# Patient Record
Sex: MALE | Race: WHITE | NOT HISPANIC OR LATINO | Employment: FULL TIME | URBAN - METROPOLITAN AREA
[De-identification: names, ages, dates, MRNs, and addresses within clinical notes are randomized per-mention and may not be internally consistent; named-entity substitution may affect disease eponyms.]

---

## 2021-02-22 ENCOUNTER — APPOINTMENT (EMERGENCY)
Dept: CT IMAGING | Facility: HOSPITAL | Age: 51
DRG: 872 | End: 2021-02-22
Payer: COMMERCIAL

## 2021-02-22 ENCOUNTER — HOSPITAL ENCOUNTER (INPATIENT)
Facility: HOSPITAL | Age: 51
LOS: 2 days | Discharge: HOME | DRG: 872 | End: 2021-02-24
Attending: EMERGENCY MEDICINE | Admitting: HOSPITALIST
Payer: COMMERCIAL

## 2021-02-22 DIAGNOSIS — A41.9 SEPSIS, DUE TO UNSPECIFIED ORGANISM, UNSPECIFIED WHETHER ACUTE ORGAN DYSFUNCTION PRESENT (HCC): ICD-10-CM

## 2021-02-22 DIAGNOSIS — K57.32 SIGMOID DIVERTICULITIS: Primary | ICD-10-CM

## 2021-02-22 PROBLEM — K57.92 DIVERTICULITIS: Status: ACTIVE | Noted: 2021-02-22

## 2021-02-22 PROBLEM — R65.20 SEVERE SEPSIS (HCC): Status: ACTIVE | Noted: 2021-02-22

## 2021-02-22 PROBLEM — I95.9 HYPOTENSION: Status: ACTIVE | Noted: 2021-02-22

## 2021-02-22 LAB
ALBUMIN SERPL BCP-MCNC: 4.2 G/DL (ref 3.5–5)
ALP SERPL-CCNC: 88 U/L (ref 46–116)
ALT SERPL W P-5'-P-CCNC: 36 U/L (ref 12–78)
ANION GAP SERPL CALCULATED.3IONS-SCNC: 6 MMOL/L (ref 4–13)
AST SERPL W P-5'-P-CCNC: 18 U/L (ref 5–45)
BASOPHILS # BLD AUTO: 0.06 THOUSANDS/ΜL (ref 0–0.1)
BASOPHILS NFR BLD AUTO: 1 % (ref 0–1)
BILIRUB SERPL-MCNC: 0.82 MG/DL (ref 0.2–1)
BILIRUB UR QL STRIP: NEGATIVE
BILIRUB UR QL STRIP: NEGATIVE
BUN SERPL-MCNC: 8 MG/DL (ref 5–25)
CALCIUM SERPL-MCNC: 8.8 MG/DL (ref 8.3–10.1)
CHLORIDE SERPL-SCNC: 102 MMOL/L (ref 100–108)
CLARITY UR: CLEAR
CLARITY UR: CLEAR
CO2 SERPL-SCNC: 28 MMOL/L (ref 21–32)
COLOR UR: YELLOW
COLOR UR: YELLOW
CREAT SERPL-MCNC: 0.88 MG/DL (ref 0.6–1.3)
EOSINOPHIL # BLD AUTO: 0.05 THOUSAND/ΜL (ref 0–0.61)
EOSINOPHIL NFR BLD AUTO: 0 % (ref 0–6)
ERYTHROCYTE [DISTWIDTH] IN BLOOD BY AUTOMATED COUNT: 13.1 % (ref 11.6–15.1)
GFR SERPL CREATININE-BSD FRML MDRD: 102 ML/MIN/1.73SQ M
GLUCOSE SERPL-MCNC: 112 MG/DL (ref 65–140)
GLUCOSE UR STRIP-MCNC: NEGATIVE MG/DL
GLUCOSE UR STRIP-MCNC: NEGATIVE MG/DL
HCT VFR BLD AUTO: 46.8 % (ref 36.5–49.3)
HGB BLD-MCNC: 15.3 G/DL (ref 12–17)
HGB UR QL STRIP.AUTO: NEGATIVE
HGB UR QL STRIP.AUTO: NEGATIVE
HOLD SPECIMEN: YES
IMM GRANULOCYTES # BLD AUTO: 0.05 THOUSAND/UL (ref 0–0.2)
IMM GRANULOCYTES NFR BLD AUTO: 0 % (ref 0–2)
INR PPP: 1.04 (ref 0.84–1.19)
KETONES UR STRIP-MCNC: NEGATIVE MG/DL
KETONES UR STRIP-MCNC: NEGATIVE MG/DL
LACTATE SERPL-SCNC: 0.9 MMOL/L (ref 0.5–2)
LEUKOCYTE ESTERASE UR QL STRIP: NEGATIVE
LEUKOCYTE ESTERASE UR QL STRIP: NEGATIVE
LIPASE SERPL-CCNC: 82 U/L (ref 73–393)
LYMPHOCYTES # BLD AUTO: 1.61 THOUSANDS/ΜL (ref 0.6–4.47)
LYMPHOCYTES NFR BLD AUTO: 12 % (ref 14–44)
MCH RBC QN AUTO: 30.1 PG (ref 26.8–34.3)
MCHC RBC AUTO-ENTMCNC: 32.7 G/DL (ref 31.4–37.4)
MCV RBC AUTO: 92 FL (ref 82–98)
MONOCYTES # BLD AUTO: 1.43 THOUSAND/ΜL (ref 0.17–1.22)
MONOCYTES NFR BLD AUTO: 11 % (ref 4–12)
NEUTROPHILS # BLD AUTO: 9.93 THOUSANDS/ΜL (ref 1.85–7.62)
NEUTS SEG NFR BLD AUTO: 76 % (ref 43–75)
NITRITE UR QL STRIP: NEGATIVE
NITRITE UR QL STRIP: NEGATIVE
NRBC BLD AUTO-RTO: 0 /100 WBCS
PH UR STRIP.AUTO: 6 [PH]
PH UR STRIP.AUTO: 6 [PH] (ref 4.5–8)
PLATELET # BLD AUTO: 250 THOUSANDS/UL (ref 149–390)
PMV BLD AUTO: 11 FL (ref 8.9–12.7)
POTASSIUM SERPL-SCNC: 4.2 MMOL/L (ref 3.5–5.3)
PROT SERPL-MCNC: 7.8 G/DL (ref 6.4–8.2)
PROT UR STRIP-MCNC: NEGATIVE MG/DL
PROT UR STRIP-MCNC: NEGATIVE MG/DL
PROTHROMBIN TIME: 13.7 SECONDS (ref 11.6–14.5)
RBC # BLD AUTO: 5.09 MILLION/UL (ref 3.88–5.62)
SODIUM SERPL-SCNC: 136 MMOL/L (ref 136–145)
SP GR UR STRIP.AUTO: 1.01 (ref 1–1.03)
SP GR UR STRIP.AUTO: <=1.005 (ref 1–1.03)
UROBILINOGEN UR QL STRIP.AUTO: 0.2 E.U./DL
UROBILINOGEN UR QL STRIP.AUTO: 0.2 E.U./DL
WBC # BLD AUTO: 13.13 THOUSAND/UL (ref 4.31–10.16)

## 2021-02-22 PROCEDURE — 96360 HYDRATION IV INFUSION INIT: CPT

## 2021-02-22 PROCEDURE — 81003 URINALYSIS AUTO W/O SCOPE: CPT | Performed by: EMERGENCY MEDICINE

## 2021-02-22 PROCEDURE — 36415 COLL VENOUS BLD VENIPUNCTURE: CPT | Performed by: EMERGENCY MEDICINE

## 2021-02-22 PROCEDURE — 85610 PROTHROMBIN TIME: CPT | Performed by: NURSE PRACTITIONER

## 2021-02-22 PROCEDURE — 93005 ELECTROCARDIOGRAM TRACING: CPT

## 2021-02-22 PROCEDURE — 81003 URINALYSIS AUTO W/O SCOPE: CPT

## 2021-02-22 PROCEDURE — 84145 PROCALCITONIN (PCT): CPT | Performed by: NURSE PRACTITIONER

## 2021-02-22 PROCEDURE — 99223 1ST HOSP IP/OBS HIGH 75: CPT | Performed by: NURSE PRACTITIONER

## 2021-02-22 PROCEDURE — G1004 CDSM NDSC: HCPCS

## 2021-02-22 PROCEDURE — 74177 CT ABD & PELVIS W/CONTRAST: CPT

## 2021-02-22 PROCEDURE — 99285 EMERGENCY DEPT VISIT HI MDM: CPT | Performed by: EMERGENCY MEDICINE

## 2021-02-22 PROCEDURE — 85025 COMPLETE CBC W/AUTO DIFF WBC: CPT | Performed by: EMERGENCY MEDICINE

## 2021-02-22 PROCEDURE — 83690 ASSAY OF LIPASE: CPT | Performed by: EMERGENCY MEDICINE

## 2021-02-22 PROCEDURE — 87040 BLOOD CULTURE FOR BACTERIA: CPT | Performed by: NURSE PRACTITIONER

## 2021-02-22 PROCEDURE — 83605 ASSAY OF LACTIC ACID: CPT | Performed by: NURSE PRACTITIONER

## 2021-02-22 PROCEDURE — 80053 COMPREHEN METABOLIC PANEL: CPT | Performed by: EMERGENCY MEDICINE

## 2021-02-22 PROCEDURE — 99285 EMERGENCY DEPT VISIT HI MDM: CPT

## 2021-02-22 RX ORDER — ACETAMINOPHEN 325 MG/1
650 TABLET ORAL EVERY 6 HOURS PRN
Status: DISCONTINUED | OUTPATIENT
Start: 2021-02-22 | End: 2021-02-24 | Stop reason: HOSPADM

## 2021-02-22 RX ORDER — ONDANSETRON 2 MG/ML
4 INJECTION INTRAMUSCULAR; INTRAVENOUS ONCE
Status: DISCONTINUED | OUTPATIENT
Start: 2021-02-22 | End: 2021-02-24 | Stop reason: HOSPADM

## 2021-02-22 RX ORDER — HYDROMORPHONE HCL/PF 1 MG/ML
0.5 SYRINGE (ML) INJECTION ONCE
Status: COMPLETED | OUTPATIENT
Start: 2021-02-22 | End: 2021-02-22

## 2021-02-22 RX ORDER — SODIUM CHLORIDE, SODIUM LACTATE, POTASSIUM CHLORIDE, CALCIUM CHLORIDE 600; 310; 30; 20 MG/100ML; MG/100ML; MG/100ML; MG/100ML
125 INJECTION, SOLUTION INTRAVENOUS CONTINUOUS
Status: DISCONTINUED | OUTPATIENT
Start: 2021-02-22 | End: 2021-02-24 | Stop reason: HOSPADM

## 2021-02-22 RX ORDER — OXYCODONE HYDROCHLORIDE 5 MG/1
2.5 TABLET ORAL EVERY 4 HOURS PRN
Status: DISCONTINUED | OUTPATIENT
Start: 2021-02-22 | End: 2021-02-24 | Stop reason: HOSPADM

## 2021-02-22 RX ORDER — HYDROMORPHONE HCL/PF 1 MG/ML
0.2 SYRINGE (ML) INJECTION EVERY 4 HOURS PRN
Status: DISCONTINUED | OUTPATIENT
Start: 2021-02-22 | End: 2021-02-24 | Stop reason: HOSPADM

## 2021-02-22 RX ORDER — ONDANSETRON 2 MG/ML
4 INJECTION INTRAMUSCULAR; INTRAVENOUS EVERY 6 HOURS PRN
Status: DISCONTINUED | OUTPATIENT
Start: 2021-02-22 | End: 2021-02-24 | Stop reason: HOSPADM

## 2021-02-22 RX ORDER — OXYCODONE HYDROCHLORIDE 5 MG/1
5 TABLET ORAL EVERY 4 HOURS PRN
Status: DISCONTINUED | OUTPATIENT
Start: 2021-02-22 | End: 2021-02-24 | Stop reason: HOSPADM

## 2021-02-22 RX ADMIN — SODIUM CHLORIDE 1000 ML: 0.9 INJECTION, SOLUTION INTRAVENOUS at 19:57

## 2021-02-22 RX ADMIN — SODIUM CHLORIDE 1000 ML: 0.9 INJECTION, SOLUTION INTRAVENOUS at 22:05

## 2021-02-22 RX ADMIN — SODIUM CHLORIDE 1000 ML: 0.9 INJECTION, SOLUTION INTRAVENOUS at 23:45

## 2021-02-22 RX ADMIN — PIPERACILLIN AND TAZOBACTAM 3.38 G: 3; .375 INJECTION, POWDER, LYOPHILIZED, FOR SOLUTION INTRAVENOUS at 22:06

## 2021-02-22 RX ADMIN — IOHEXOL 100 ML: 350 INJECTION, SOLUTION INTRAVENOUS at 20:28

## 2021-02-22 RX ADMIN — HYDROMORPHONE HYDROCHLORIDE 0.5 MG: 1 INJECTION, SOLUTION INTRAMUSCULAR; INTRAVENOUS; SUBCUTANEOUS at 22:06

## 2021-02-23 LAB
ALBUMIN SERPL BCP-MCNC: 3.1 G/DL (ref 3.5–5)
ALP SERPL-CCNC: 64 U/L (ref 46–116)
ALT SERPL W P-5'-P-CCNC: 23 U/L (ref 12–78)
ANION GAP SERPL CALCULATED.3IONS-SCNC: 10 MMOL/L (ref 4–13)
AST SERPL W P-5'-P-CCNC: 21 U/L (ref 5–45)
BASOPHILS # BLD AUTO: 0.05 THOUSANDS/ΜL (ref 0–0.1)
BASOPHILS NFR BLD AUTO: 0 % (ref 0–1)
BILIRUB SERPL-MCNC: 0.85 MG/DL (ref 0.2–1)
BUN SERPL-MCNC: 7 MG/DL (ref 5–25)
CALCIUM ALBUM COR SERPL-MCNC: 8.5 MG/DL (ref 8.3–10.1)
CALCIUM SERPL-MCNC: 7.8 MG/DL (ref 8.3–10.1)
CHLORIDE SERPL-SCNC: 107 MMOL/L (ref 100–108)
CO2 SERPL-SCNC: 22 MMOL/L (ref 21–32)
CREAT SERPL-MCNC: 0.72 MG/DL (ref 0.6–1.3)
EOSINOPHIL # BLD AUTO: 0.03 THOUSAND/ΜL (ref 0–0.61)
EOSINOPHIL NFR BLD AUTO: 0 % (ref 0–6)
ERYTHROCYTE [DISTWIDTH] IN BLOOD BY AUTOMATED COUNT: 13.3 % (ref 11.6–15.1)
GFR SERPL CREATININE-BSD FRML MDRD: 110 ML/MIN/1.73SQ M
GLUCOSE SERPL-MCNC: 116 MG/DL (ref 65–140)
HCT VFR BLD AUTO: 40.8 % (ref 36.5–49.3)
HGB BLD-MCNC: 13.5 G/DL (ref 12–17)
IMM GRANULOCYTES # BLD AUTO: 0.06 THOUSAND/UL (ref 0–0.2)
IMM GRANULOCYTES NFR BLD AUTO: 1 % (ref 0–2)
LYMPHOCYTES # BLD AUTO: 1.5 THOUSANDS/ΜL (ref 0.6–4.47)
LYMPHOCYTES NFR BLD AUTO: 13 % (ref 14–44)
MCH RBC QN AUTO: 31.1 PG (ref 26.8–34.3)
MCHC RBC AUTO-ENTMCNC: 33.1 G/DL (ref 31.4–37.4)
MCV RBC AUTO: 94 FL (ref 82–98)
MONOCYTES # BLD AUTO: 1.37 THOUSAND/ΜL (ref 0.17–1.22)
MONOCYTES NFR BLD AUTO: 12 % (ref 4–12)
NEUTROPHILS # BLD AUTO: 8.24 THOUSANDS/ΜL (ref 1.85–7.62)
NEUTS SEG NFR BLD AUTO: 74 % (ref 43–75)
NRBC BLD AUTO-RTO: 0 /100 WBCS
PLATELET # BLD AUTO: 212 THOUSANDS/UL (ref 149–390)
PMV BLD AUTO: 11.3 FL (ref 8.9–12.7)
POTASSIUM SERPL-SCNC: 3.9 MMOL/L (ref 3.5–5.3)
PROCALCITONIN SERPL-MCNC: 0.09 NG/ML
PROT SERPL-MCNC: 6.1 G/DL (ref 6.4–8.2)
RBC # BLD AUTO: 4.34 MILLION/UL (ref 3.88–5.62)
SODIUM SERPL-SCNC: 139 MMOL/L (ref 136–145)
WBC # BLD AUTO: 11.25 THOUSAND/UL (ref 4.31–10.16)

## 2021-02-23 PROCEDURE — 99232 SBSQ HOSP IP/OBS MODERATE 35: CPT | Performed by: HOSPITALIST

## 2021-02-23 PROCEDURE — 99223 1ST HOSP IP/OBS HIGH 75: CPT | Performed by: INTERNAL MEDICINE

## 2021-02-23 PROCEDURE — 85025 COMPLETE CBC W/AUTO DIFF WBC: CPT | Performed by: NURSE PRACTITIONER

## 2021-02-23 PROCEDURE — 80053 COMPREHEN METABOLIC PANEL: CPT | Performed by: NURSE PRACTITIONER

## 2021-02-23 RX ADMIN — SODIUM CHLORIDE 250 ML: 0.9 INJECTION, SOLUTION INTRAVENOUS at 00:35

## 2021-02-23 RX ADMIN — SODIUM CHLORIDE, SODIUM LACTATE, POTASSIUM CHLORIDE, AND CALCIUM CHLORIDE 125 ML/HR: .6; .31; .03; .02 INJECTION, SOLUTION INTRAVENOUS at 01:22

## 2021-02-23 RX ADMIN — PIPERACILLIN AND TAZOBACTAM 3.38 G: 36; 4.5 INJECTION, POWDER, FOR SOLUTION INTRAVENOUS at 21:28

## 2021-02-23 RX ADMIN — PIPERACILLIN AND TAZOBACTAM 3.38 G: 36; 4.5 INJECTION, POWDER, FOR SOLUTION INTRAVENOUS at 05:13

## 2021-02-23 RX ADMIN — PIPERACILLIN AND TAZOBACTAM 3.38 G: 36; 4.5 INJECTION, POWDER, FOR SOLUTION INTRAVENOUS at 09:21

## 2021-02-23 RX ADMIN — SODIUM CHLORIDE, SODIUM LACTATE, POTASSIUM CHLORIDE, AND CALCIUM CHLORIDE 125 ML/HR: .6; .31; .03; .02 INJECTION, SOLUTION INTRAVENOUS at 11:34

## 2021-02-23 RX ADMIN — ENOXAPARIN SODIUM 40 MG: 40 INJECTION SUBCUTANEOUS at 09:21

## 2021-02-23 RX ADMIN — SODIUM CHLORIDE, SODIUM LACTATE, POTASSIUM CHLORIDE, AND CALCIUM CHLORIDE 125 ML/HR: .6; .31; .03; .02 INJECTION, SOLUTION INTRAVENOUS at 21:20

## 2021-02-23 RX ADMIN — PIPERACILLIN AND TAZOBACTAM 3.38 G: 36; 4.5 INJECTION, POWDER, FOR SOLUTION INTRAVENOUS at 15:39

## 2021-02-23 NOTE — ASSESSMENT & PLAN NOTE
· Likely secondary to severe sepsis    S/p 1 L fluid bolus with improvement in blood pressure  · Check orthostatic BP  · Fall precaution  · IV fluids as outlined above

## 2021-02-23 NOTE — ED NOTES
Provided patient with urine cup and informed of need for sample   States "I can not provide sample right now "     Lavonne Shah RN  02/22/21 1958

## 2021-02-23 NOTE — UTILIZATION REVIEW
Initial Clinical Review    Admission: Date/Time/Statement:   Admission Orders (From admission, onward)     Ordered        02/22/21 2108  Inpatient Admission  Once                   Orders Placed This Encounter   Procedures    Inpatient Admission     Standing Status:   Standing     Number of Occurrences:   1     Order Specific Question:   Level of Care     Answer:   Med Surg [16]     Order Specific Question:   Estimated length of stay     Answer:   More than 2 Midnights     Order Specific Question:   Certification     Answer:   I certify that inpatient services are medically necessary for this patient for a duration of greater than two midnights  See H&P and MD Progress Notes for additional information about the patient's course of treatment  ED Arrival Information     Expected Arrival Acuity Means of Arrival Escorted By Service Admission Type    2/22/2021 2/22/2021 18:46 Urgent Walk-In Self Hospitalist Urgent    Arrival Complaint    fever / abdominal pain        Chief Complaint   Patient presents with    Abdominal Pain     C/o generalized lower ABD pain starting last night  Additionally c/o fever starting this afternoon  Assessment/Plan: 53 yo male to ED from Urgent Care admitted as Inpatient due to Severe sepsis, hypotension w Diverticulitis  Reports 1 day worsening of left lower quadrant pain w fevers ([id number])  Sought eval in Urgent care & advised ED eval  Endorses "bladder spasms", reports he increased fluid intake bc he thought he had UTI  IN ED: labile BPs- CT w sigmoid diverticulitis  Received 1 L fluid ED & ongoing IV fluids  Continue ongoing IV antibx  Obtain blood cultures STAT  COVID negative 2/20  Serial abd exam & consult GI    2/23/2021 PROVIDER  Severe sepsis w source suspected sigmoid diverticulitis  IVF s/p 30 ML/KG w frequent vitals; cont IVF  Denies prior hx of diverticulitis or prio colonoscopy  Cont serial eval , GI consult  Check orthostatic BP  Fall precaution    2/23/2021 GI  Diverticulitis without complications, first episode: Patient with three day history of abdominal pain and one day history of fevers presented to ED  CT with contrast noting severe stranding around sigmoid colon consistent with diverticulitis  WBC on arrival was 13 13 now 11 25   UA normal  Patient started on Zosyn and fluids  Patient on clear liquid diet   -Continue abx/fluids   -Continue pain medications and anti-emetics as needed  -Diet   -Recommend outpatient colonoscopy in 6-8 weeks   -Abdominal exams to assess for increased pain, distention, nausea, vomiting    ED Triage Vitals [02/22/21 1901]   Temperature Pulse Respirations Blood Pressure SpO2   98 °F (36 7 °C) 98 18 (!) 179/86 98 %      Temp Source Heart Rate Source Patient Position - Orthostatic VS BP Location FiO2 (%)   Temporal Monitor Sitting Left arm --      Pain Score       5          Wt Readings from Last 1 Encounters:   02/23/21 101 kg (222 lb 10 6 oz)     Additional Vital Signs:   Date/Time  Temp  Pulse  Resp  BP  SpO2  O2 Device  Patient Position - Orthostatic VS   02/23/21 1100  --  --  --  160/89  --  --  Standing for 3 minutes - Orthostatic VS   02/23/21 1057  --  --  --  155/89  --  --  Standing - Orthostatic VS   02/23/21 1056  --  --  --  146/87  --  --  Sitting - Orthostatic VS   02/23/21 1055  --  --  --  142/85  --  --  Lying - Orthostatic VS   02/23/21 0711  99 2 °F (37 3 °C)  81  16  137/79  97 %  None (Room air)  Lying   02/23/21 0200  --  77  --  141/71  --  --  Lying   02/23/21 0145  --  --  18  126/71  --  --  Lying   02/23/21 0130  --  79  18  131/70  --  --  Lying   02/23/21 0117  --  80  16  132/68  97 %  None (Room air)  Lying   02/22/21 2349  --  84  16  123/65  94 %  None (Room air)  Lying   02/22/21 2127  --  102  18  167/87  96 %  None (Room air)  Standing for 3 minutes - Orthostatic VS   02/22/21 2124  --  102  18  163/77  94 %  None (Room air)  Standing - Orthostatic VS   02/22/21 2123  --  99  18  176/87Abnormal   96 % None (Room air)  Sitting - Orthostatic VS   02/22/21 2119  99 8 °F (37 7 °C)  91  18  166/78  96 %  None (Room air)  Lying - Orthostatic VS   02/22/21 1940  --  81  16  140/69  94 %  None (Room air)  Lying   02/22/21 1919  --  68  12  79/46Abnormal    99 %  None (Room air)  Sitting   BP: feeling "like Im gonna pass out" at 02/22/21 1919 02/22/21 1901  98 °F (36 7 °C)  98  18  179/86Abnormal   98 %  None (Room air)  Sitting      Weights (last 14 days)    Date/Time  Weight  Weight Method  Height   02/23/21 0556  101 kg (222 lb 10 6 oz)  Bed scale  --   02/23/21 0117  101 kg (222 lb 3 6 oz)  Bed scale  --   02/22/21 2119  104 kg (228 lb 13 4 oz)  Bed scale  5' 10" (1 778 m)       Pertinent Labs/Diagnostic Test Results:   Results from last 7 days   Lab Units 02/20/21  1104   SARS-COV-2  Negative     Results from last 7 days   Lab Units 02/23/21  0501 02/22/21  1905   WBC Thousand/uL 11 25* 13 13*   HEMOGLOBIN g/dL 13 5 15 3   HEMATOCRIT % 40 8 46 8   PLATELETS Thousands/uL 212 250   NEUTROS ABS Thousands/µL 8 24* 9 93*         Results from last 7 days   Lab Units 02/23/21  0501 02/22/21  1905   SODIUM mmol/L 139 136   POTASSIUM mmol/L 3 9 4 2   CHLORIDE mmol/L 107 102   CO2 mmol/L 22 28   ANION GAP mmol/L 10 6   BUN mg/dL 7 8   CREATININE mg/dL 0 72 0 88   EGFR ml/min/1 73sq m 110 102   CALCIUM mg/dL 7 8* 8 8     Results from last 7 days   Lab Units 02/23/21  0501 02/22/21  1905   AST U/L 21 18   ALT U/L 23 36   ALK PHOS U/L 64 88   TOTAL PROTEIN g/dL 6 1* 7 8   ALBUMIN g/dL 3 1* 4 2   TOTAL BILIRUBIN mg/dL 0 85 0 82         Results from last 7 days   Lab Units 02/23/21  0501 02/22/21  1905   GLUCOSE RANDOM mg/dL 116 112       Results from last 7 days   Lab Units 02/22/21  1905   PROTIME seconds 13 7   INR  1 04         Results from last 7 days   Lab Units 02/22/21  2204   PROCALCITONIN ng/ml 0 09     Results from last 7 days   Lab Units 02/22/21  2204   LACTIC ACID mmol/L 0 9                         Results from last 7 days   Lab Units 02/22/21  1905   LIPASE u/L 82             Results from last 7 days   Lab Units 02/22/21  2046 02/22/21  2043   CLARITY UA  Clear Clear   COLOR UA  Yellow Yellow   SPEC GRAV UA  1 010 <=1 005   PH UA  6 0 6 0   GLUCOSE UA mg/dl Negative Negative   KETONES UA mg/dl Negative Negative   BLOOD UA  Negative Negative   PROTEIN UA mg/dl Negative Negative   NITRITE UA  Negative Negative   BILIRUBIN UA  Negative Negative   UROBILINOGEN UA E U /dl 0 2 0 2   LEUKOCYTES UA  Negative Negative     Results from last 7 days   Lab Units 02/22/21  2204 02/22/21  1903   BLOOD CULTURE  Received in Microbiology Lab  Culture in Progress  Received in Microbiology Lab  Culture in Progress  2/22/2021    CT abdomen pelvis with contrast   Final Result  (02/22 2049)      Sigmoid diverticulitis  ED Treatment:   Medication Administration from 02/22/2021 1829 to 02/23/2021 0101       Date/Time Order Dose Route Action     02/22/2021 1957 sodium chloride 0 9 % bolus 1,000 mL 1,000 mL Intravenous New Bag     02/22/2021 2206 piperacillin-tazobactam (ZOSYN) 3 375 g in sodium chloride 0 9 % 100 mL IVPB 3 375 g Intravenous New Bag     02/22/2021 2206 HYDROmorphone (DILAUDID) injection 0 5 mg 0 5 mg Intravenous Given     02/22/2021 2205 sodium chloride 0 9 % bolus 1,000 mL 1,000 mL Intravenous New Bag     02/22/2021 2345 sodium chloride 0 9 % bolus 1,000 mL 1,000 mL Intravenous New Bag     02/23/2021 0035 sodium chloride 0 9 % bolus 250 mL 250 mL Intravenous New Bag        History reviewed  No pertinent past medical history    Present on Admission:  **None**      Admitting Diagnosis: Abdominal pain [R10 9]  Sigmoid diverticulitis [K57 32]  Fever [R50 9]  Sepsis, due to unspecified organism, unspecified whether acute organ dysfunction present ([facility] ) [A41 9]  Age/Sex: 52 y o  male  Admission Orders:  Orthostatic BP  Up w assist  Clear liquid diet  Scheduled Medications:  enoxaparin, 40 mg, Subcutaneous, Daily  ondansetron, 4 mg, Intravenous, Once  piperacillin-tazobactam, 3 375 g, Intravenous, Q6H      Continuous IV Infusions:  lactated ringers, 125 mL/hr, Intravenous, Continuous    PRN Meds:  acetaminophen, 650 mg, Oral, Q6H PRN  HYDROmorphone, 0 2 mg, Intravenous, Q4H PRN  ondansetron, 4 mg, Intravenous, Q6H PRN  oxyCODONE, 2 5 mg, Oral, Q4H PRN  oxyCODONE, 5 mg, Oral, Q4H PRN    IP CONSULT TO GASTROENTEROLOGY  Network Utilization Review Department  ATTENTION: Please call with any questions or concerns to [phone] and carefully listen to the prompts so that you are directed to the right person  All voicemails are confidential   Paramjit Bueno all requests for admission clinical reviews, approved or denied determinations and any other requests to dedicated fax number below belonging to the campus where the patient is receiving treatment   List of dedicated fax numbers for the Facilities:  [address] DENIALS (Administrative/Medical Necessity) [phone]   [address] (Maternity/NICU/Pediatrics) [phone]   [address] [place] [address] [address] [place] [address] [address] ([address]) [address] [address] [address] [address] [place] [address] [phone]

## 2021-02-23 NOTE — H&P
H&P- Kavita Decent 1970, 52 y o  male MRN: [id number]    Unit/Bed#: Amado Alondra Encounter: [id number]    Primary Care Provider: Delia Dejesus MD   Date and time admitted to hospital: 2/22/2021  7:30 PM        * Severe sepsis [facility])  Assessment & Plan   SIRS criteria:  Pre-hospital temperature 102°, leukocytosis, tachycardia   Suspected source:  Sigmoid diverticulitis   UA:  Negative   Lactic acid:  Obtain stat   End organ damage:  Blood pressure 79/46  Responsive to IV fluids   COVID negative on 02/20   IV Fluids:  30 mL per kg fluid bolus with frequent vital signs  And continue with LR at 125 cc/hour   IV antibiotics:  Zosyn 3 375 g every 6 hours   Blood cultures:  Obtain stat      Diverticulitis  Assessment & Plan  · Presentation:  1 day history of bilateral lower quadrant (worsen in left lower quadrant), fever  Seen at urgent care in advised evaluation in the emergency department  · Denies prior history of diverticulitis  Denies prior colonoscopy  · CT abdomen pelvis:  Sigmoid diverticulitis  · Antibiotics:  As above  · Serial abdominal exams  · GI consultation    Hypotension  Assessment & Plan  · Likely secondary to severe sepsis  So far, received 1 L fluid bolus with improvement in blood pressure  · Check orthostatic BP  · Fall precaution  · IV fluids as outlined above      VTE Prophylaxis: Enoxaparin (Lovenox)  / reason for no mechanical VTE prophylaxis Low risk   Code Status:  Level 1  POLST: POLST is not applicable to this patient  Discussion with family:  Declined update to his girlfriend    Anticipated Length of Stay:  Patient will be admitted on an Inpatient basis with an anticipated length of stay of  greater than 2 midnights  Justification for Hospital Stay:  Severe sepsis requiring IV fluids, IV antibiotics, GI consultation    Total Time for Visit, including Counseling / Coordination of Care: 45 minutes    Greater than 50% of this total time spent on direct patient counseling and coordination of care  Chief Complaint:   Abdominal pain, fever    History of Present Illness:    Estefany Braun is a 52 y o  male with no significant past medical history who presents with 1 day history of bilateral lower quadrant pain with pain worse on the left lower quadrant and fever as high as 102  He was seen at urgent care in advised to be evaluated in the emergency department  He denies any chest pain, cough, shortness of breath, nausea, vomiting, diarrhea, bleeding, lower extremity edema  He does endorse "bladder spasms" in brief episode of lightheadedness while in emergency department  Patient reports he increased p o  Fluid intake as he thought he had a UTI  Patient meeting severe sepsis criteria on admission  Patient is admitted as inpatient for IV fluids, IV antibiotics  GI consultation pending    Review of Systems:    Review of Systems   Constitutional: Positive for chills and fever  Gastrointestinal: Positive for abdominal pain  Neurological: Positive for dizziness and light-headedness  All other systems reviewed and are negative  Past Medical and Surgical History:     History reviewed  No pertinent past medical history  History reviewed  No pertinent surgical history  Meds/Allergies:    Prior to Admission medications    Not on File     I have reviewed home medications with patient personally      Allergies: No Known Allergies    Social History:     Marital Status: Single     Substance Use History:   Social History     Substance and Sexual Activity   Alcohol Use Never    Frequency: Never     Social History     Tobacco Use   Smoking Status Never Smoker   Smokeless Tobacco Never Used     Social History     Substance and Sexual Activity   Drug Use Never       Family History:    non-contributory    Physical Exam:     Vitals:   Blood Pressure: 167/87 (02/22/21 2127)  Pulse: 102 (02/22/21 2127)  Temperature: 99 8 °F (37 7 °C) (02/22/21 2119)  Temp Source: Oral (02/22/21 2119)  Respirations: 18 (02/22/21 2127)  Height: 5' 10" (177 8 cm) (02/22/21 2119)  Weight - Scale: 104 kg (228 lb 13 4 oz) (02/22/21 2119)  SpO2: 96 % (02/22/21 2127)    Physical Exam  Constitutional:       General: He is not in acute distress  Appearance: Normal appearance  He is not ill-appearing  HENT:      Head: Normocephalic and atraumatic  Right Ear: External ear normal       Left Ear: External ear normal       Nose: Nose normal       Mouth/Throat:      Mouth: Mucous membranes are moist       Pharynx: Oropharynx is clear  Eyes:      General: No scleral icterus  Extraocular Movements: Extraocular movements intact  Conjunctiva/sclera: Conjunctivae normal    Neck:      Musculoskeletal: Normal range of motion and neck supple  Cardiovascular:      Rate and Rhythm: Normal rate and regular rhythm  Pulses: Normal pulses  Heart sounds: Normal heart sounds  Pulmonary:      Effort: Pulmonary effort is normal       Breath sounds: Normal breath sounds  Abdominal:      General: Bowel sounds are normal  There is no distension  Palpations: Abdomen is soft  Tenderness: There is abdominal tenderness (Bilateral lower quadrant, LLQ > RLQ)  There is no right CVA tenderness, left CVA tenderness, guarding or rebound  Musculoskeletal: Normal range of motion  Right lower leg: No edema  Left lower leg: No edema  Skin:     General: Skin is warm and dry  Capillary Refill: Capillary refill takes less than 2 seconds  Neurological:      General: No focal deficit present  Mental Status: He is alert and oriented to person, place, and time  Psychiatric:         Mood and Affect: Mood normal          Behavior: Behavior normal              Additional Data:     Lab Results: I have personally reviewed pertinent reports        Results from last 7 days   Lab Units 02/22/21  1905   WBC Thousand/uL 13 13*   HEMOGLOBIN g/dL 15 3   HEMATOCRIT % 46 8   PLATELETS Thousands/uL 250   NEUTROS PCT % 76*   LYMPHS PCT % 12*   MONOS PCT % 11   EOS PCT % 0     Results from last 7 days   Lab Units 02/22/21  1905   SODIUM mmol/L 136   POTASSIUM mmol/L 4 2   CHLORIDE mmol/L 102   CO2 mmol/L 28   BUN mg/dL 8   CREATININE mg/dL 0 88   ANION GAP mmol/L 6   CALCIUM mg/dL 8 8   ALBUMIN g/dL 4 2   TOTAL BILIRUBIN mg/dL 0 82   ALK PHOS U/L 88   ALT U/L 36   AST U/L 18   GLUCOSE RANDOM mg/dL 112     Results from last 7 days   Lab Units 02/22/21  1905   INR  1 04                   Imaging: I have personally reviewed pertinent reports  CT abdomen pelvis with contrast   Final Result by Cruz Velasquez MD (02/22 2049)      Sigmoid diverticulitis  Workstation performed: [id number]             EKG, Pathology, and Other Studies Reviewed on Admission:   · EKG:  Obtain baseline EKG now    Allscripts / Epic Records Reviewed: Yes     ** Please Note: This note has been constructed using a voice recognition system   **

## 2021-02-23 NOTE — ED PROVIDER NOTES
History  Chief Complaint   Patient presents with    Abdominal Pain     C/o generalized lower ABD pain starting last night  Additionally c/o fever starting this afternoon  This is a 70-year-old male presenting for evaluation of lower abdominal pain, fevers and chills for the past 3 days  He states he has had some intermittent discomfort with urination  Went to urgent care today and was told to come to the ED for evaluation  He was tested for COVID-19 and was negative today  History provided by:  Patient   used: No    Abdominal Pain  Pain location:  LLQ, RLQ and suprapubic  Pain quality: aching    Pain severity:  Moderate  Onset quality:  Gradual  Duration:  3 days  Timing:  Constant  Progression:  Worsening  Chronicity:  New  Associated symptoms: chills and fever        None       History reviewed  No pertinent past medical history  History reviewed  No pertinent surgical history  History reviewed  No pertinent family history  I have reviewed and agree with the history as documented  E-Cigarette/Vaping     E-Cigarette/Vaping Substances     Social History     Tobacco Use    Smoking status: Never Smoker    Smokeless tobacco: Never Used   Substance Use Topics    Alcohol use: Never     Frequency: Never    Drug use: Never       Review of Systems   Constitutional: Positive for chills and fever  Gastrointestinal: Positive for abdominal pain  All other systems reviewed and are negative  Physical Exam  Physical Exam  Vitals signs and nursing note reviewed  Constitutional:       General: He is not in acute distress  Appearance: Normal appearance  He is well-developed and normal weight  HENT:      Head: Normocephalic and atraumatic  Right Ear: External ear normal       Left Ear: External ear normal       Nose: Nose normal  No congestion or rhinorrhea  Mouth/Throat:      Mouth: Mucous membranes are moist       Pharynx: Oropharynx is clear     Eyes: General: No scleral icterus  Right eye: No discharge  Left eye: No discharge  Conjunctiva/sclera: Conjunctivae normal    Neck:      Musculoskeletal: Normal range of motion and neck supple  Cardiovascular:      Rate and Rhythm: Normal rate and regular rhythm  Pulses: Normal pulses  Heart sounds: Normal heart sounds  Pulmonary:      Effort: Pulmonary effort is normal  No respiratory distress  Breath sounds: Normal breath sounds  Abdominal:      General: Abdomen is flat  There is no distension  Palpations: Abdomen is soft  There is no mass or pulsatile mass  Tenderness: There is abdominal tenderness in the right lower quadrant, suprapubic area and left lower quadrant  There is rebound  There is no right CVA tenderness, left CVA tenderness or guarding  Hernia: No hernia is present  Musculoskeletal: Normal range of motion  General: No swelling  Skin:     General: Skin is warm and dry  Capillary Refill: Capillary refill takes less than 2 seconds  Neurological:      General: No focal deficit present  Mental Status: He is alert and oriented to person, place, and time  Mental status is at baseline     Psychiatric:         Mood and Affect: Mood normal          Behavior: Behavior normal          Vital Signs  ED Triage Vitals [02/22/21 1901]   Temperature Pulse Respirations Blood Pressure SpO2   98 °F (36 7 °C) 98 18 (!) 179/86 98 %      Temp Source Heart Rate Source Patient Position - Orthostatic VS BP Location FiO2 (%)   Temporal Monitor Sitting Left arm --      Pain Score       5           Vitals:    02/23/21 1500 02/23/21 1501 02/23/21 1502 02/23/21 1505   BP: 163/78 159/76 161/78 162/84   Pulse: 80 75 83 79   Patient Position - Orthostatic VS: Lying - Orthostatic VS Sitting - Orthostatic VS Standing - Orthostatic VS Standing for 3 minutes - Orthostatic VS         Visual Acuity      ED Medications  Medications   ondansetron (ZOFRAN) injection 4 mg (0 mg Intravenous Hold 2/22/21 1957)   ondansetron (ZOFRAN) injection 4 mg (has no administration in time range)   lactated ringers infusion (125 mL/hr Intravenous New Bag 2/23/21 1134)   acetaminophen (TYLENOL) tablet 650 mg (has no administration in time range)   enoxaparin (LOVENOX) subcutaneous injection 40 mg (40 mg Subcutaneous Given 2/23/21 0921)   piperacillin-tazobactam (ZOSYN) 3 375 g in sodium chloride 0 9 % 100 mL IVPB (3 375 g Intravenous New Bag 2/23/21 1539)   oxyCODONE (ROXICODONE) IR tablet 2 5 mg (has no administration in time range)   oxyCODONE (ROXICODONE) IR tablet 5 mg (has no administration in time range)   HYDROmorphone (DILAUDID) injection 0 2 mg (has no administration in time range)   sodium chloride 0 9 % bolus 1,000 mL (0 mL Intravenous Stopped 2/22/21 2130)   iohexol (OMNIPAQUE) 350 MG/ML injection (SINGLE-DOSE) 100 mL (100 mL Intravenous Given 2/22/21 2028)   piperacillin-tazobactam (ZOSYN) 3 375 g in sodium chloride 0 9 % 100 mL IVPB (0 g Intravenous Stopped 2/22/21 2236)   HYDROmorphone (DILAUDID) injection 0 5 mg (0 5 mg Intravenous Given 2/22/21 2206)   sodium chloride 0 9 % bolus 1,000 mL (0 mL Intravenous Stopped 2/22/21 2235)     Followed by   sodium chloride 0 9 % bolus 1,000 mL (0 mL Intravenous Stopped 2/23/21 0015)     Followed by   sodium chloride 0 9 % bolus 250 mL (0 mL Intravenous Stopped 2/23/21 1933)       Diagnostic Studies  Results Reviewed     Procedure Component Value Units Date/Time    Blood culture [[id number]] Collected: 02/22/21 1903    Lab Status: Preliminary result Specimen: Blood from Arm, Right Updated: 02/23/21 0801     Blood Culture Received in Microbiology Lab  Culture in Progress  Blood culture [[id number]] Collected: 02/22/21 2204    Lab Status: Preliminary result Specimen: Blood from Arm, Right Updated: 02/23/21 0801     Blood Culture Received in Microbiology Lab  Culture in Progress      Procalcitonin with AM Reflex [[id number]]  (Normal) Collected: 02/22/21 2204    Lab Status: Final result Specimen: Blood from Arm, Right Updated: 02/23/21 0626     Procalcitonin 0 09 ng/ml     Comprehensive metabolic panel [[id number]]  (Abnormal) Collected: 02/23/21 0501    Lab Status: Final result Specimen: Blood from Arm, Left Updated: 02/23/21 0183     Sodium 139 mmol/L      Potassium 3 9 mmol/L      Chloride 107 mmol/L      CO2 22 mmol/L      ANION GAP 10 mmol/L      BUN 7 mg/dL      Creatinine 0 72 mg/dL      Glucose 116 mg/dL      Calcium 7 8 mg/dL      Corrected Calcium 8 5 mg/dL      AST 21 U/L      ALT 23 U/L      Alkaline Phosphatase 64 U/L      Total Protein 6 1 g/dL      Albumin 3 1 g/dL      Total Bilirubin 0 85 mg/dL      eGFR 110 ml/min/1 73sq m     Narrative:      National Kid[employer] Foundation guidelines for Chronic Kidney Disease (CKD):     Stage 1 with normal or high GFR (GFR > 90 mL/min/1 73 square meters)    Stage 2 Mild CKD (GFR = 60-89 mL/min/1 73 square meters)    Stage 3A Moderate CKD (GFR = 45-59 mL/min/1 73 square meters)    Stage 3B Moderate CKD (GFR = 30-44 mL/min/1 73 square meters)    Stage 4 Severe CKD (GFR = 15-29 mL/min/1 73 square meters)    Stage 5 End Stage CKD (GFR <15 mL/min/1 73 square meters)  Note: GFR calculation is accurate only with a steady state creatinine    CBC and differential [[id number]]  (Abnormal) Collected: 02/23/21 0501    Lab Status: Final result Specimen: Blood from Arm, Left Updated: 02/23/21 0527     WBC 11 25 Thousand/uL      RBC 4 34 Million/uL      Hemoglobin 13 5 g/dL      Hematocrit 40 8 %      MCV 94 fL      MCH 31 1 pg      MCHC 33 1 g/dL      RDW 13 3 %      MPV 11 3 fL      Platelets 353 Thousands/uL      nRBC 0 /100 WBCs      Neutrophils Relative 74 %      Immat GRANS % 1 %      Lymphocytes Relative 13 %      Monocytes Relative 12 %      Eosinophils Relative 0 %      Basophils Relative 0 %      Neutrophils Absolute 8 24 Thousands/µL      Immature Grans Absolute 0 06 Thousand/uL      Lymphocytes Absolute 1 50 Thousands/µL      Monocytes Absolute 1 37 Thousand/µL      Eosinophils Absolute 0 03 Thousand/µL      Basophils Absolute 0 05 Thousands/µL     Lactic acid, plasma [[id number]]  (Normal) Collected: 02/22/21 2204    Lab Status: Final result Specimen: Blood from Arm, Right Updated: 02/22/21 2241     LACTIC ACID 0 9 mmol/L     Narrative:      Result may be elevated if tourniquet was used during collection      Protime-INR [[id number]]  (Normal) Collected: 02/22/21 1905    Lab Status: Final result Specimen: Blood from Arm, Left Updated: 02/22/21 2146     Protime 13 7 seconds      INR 1 04    UA w Reflex to Microscopic w Reflex to Culture [[id number]] Collected: 02/22/21 2043    Lab Status: Final result Specimen: Urine, Clean Catch Updated: 02/22/21 2058     Color, UA Yellow     Clarity, UA Clear     Specific Gravity, UA <=1 005     pH, UA 6 0     Leukocytes, UA Negative     Nitrite, UA Negative     Protein, UA Negative mg/dl      Glucose, UA Negative mg/dl      Ketones, UA Negative mg/dl      Urobilinogen, UA 0 2 E U /dl      Bilirubin, UA Negative     Blood, UA Negative    Urine Macroscopic, POC [[id number]] Collected: 02/22/21 2046    Lab Status: Final result Specimen: Urine Updated: 02/22/21 2048     Color, UA Yellow     Clarity, UA Clear     pH, UA 6 0     Leukocytes, UA Negative     Nitrite, UA Negative     Protein, UA Negative mg/dl      Glucose, UA Negative mg/dl      Ketones, UA Negative mg/dl      Urobilinogen, UA 0 2 E U /dl      Bilirubin, UA Negative     Blood, UA Negative     Specific Gravity, UA 1 010    Narrative:      CLINITEK RESULT    Comprehensive metabolic panel [[id number]] Collected: 02/22/21 1905    Lab Status: Final result Specimen: Blood from Arm, Left Updated: 02/22/21 1943     Sodium 136 mmol/L      Potassium 4 2 mmol/L      Chloride 102 mmol/L      CO2 28 mmol/L      ANION GAP 6 mmol/L      BUN 8 mg/dL      Creatinine 0 88 mg/dL      Glucose 112 mg/dL      Calcium 8 8 mg/dL      AST 18 U/L      ALT 36 U/L      Alkaline Phosphatase 88 U/L      Total Protein 7 8 g/dL      Albumin 4 2 g/dL      Total Bilirubin 0 82 mg/dL      eGFR 102 ml/min/1 73sq m     Narrative:      [place] guidelines for Chronic Kidney Disease (CKD):     Stage 1 with normal or high GFR (GFR > 90 mL/min/1 73 square meters)    Stage 2 Mild CKD (GFR = 60-89 mL/min/1 73 square meters)    Stage 3A Moderate CKD (GFR = 45-59 mL/min/1 73 square meters)    Stage 3B Moderate CKD (GFR = 30-44 mL/min/1 73 square meters)    Stage 4 Severe CKD (GFR = 15-29 mL/min/1 73 square meters)    Stage 5 End Stage CKD (GFR <15 mL/min/1 73 square meters)  Note: GFR calculation is accurate only with a steady state creatinine    Lipase [[id number]]  (Normal) Collected: 02/22/21 1905    Lab Status: Final result Specimen: Blood from Arm, Left Updated: 02/22/21 1943     Lipase 82 u/L     Trauma tubes on hold [[id number]] Collected: 02/22/21 1905    Lab Status: Final result Specimen: Blood from Arm, Left Updated: 02/22/21 1915    Narrative: The following orders were created for panel order Trauma tubes on hold  Procedure                               Abnormality         Status                     ---------                               -----------         ------                     [facility] [place] [facility] [place][id number][place]                            Final result                 Please view results for these tests on the individual orders      CBC and differential [[id number]]  (Abnormal) Collected: 02/22/21 1905    Lab Status: Final result Specimen: Blood from Arm, Left Updated: 02/22/21 1915     WBC 13 13 Thousand/uL      RBC 5 09 Million/uL      Hemoglobin 15 3 g/dL      Hematocrit 46 8 %      MCV 92 fL      MCH 30 1 pg      MCHC 32 7 g/dL      RDW 13 1 %      MPV 11 0 fL      Platelets 746 Thousands/uL      nRBC 0 /100 WBCs      Neutrophils Relative 76 %      Immat GRANS % 0 %      Lymphocytes Relative 12 %      Monocytes Relative 11 %      Eosinophils Relative 0 %      Basophils Relative 1 %      Neutrophils Absolute 9 93 Thousands/µL      Immature Grans Absolute 0 05 Thousand/uL      Lymphocytes Absolute 1 61 Thousands/µL      Monocytes Absolute 1 43 Thousand/µL      Eosinophils Absolute 0 05 Thousand/µL      Basophils Absolute 0 06 Thousands/µL                  CT abdomen pelvis with contrast   Final Result by Carlos Olmos MD (02/22 2049)      Sigmoid diverticulitis  Workstation performed: [id number]                    Procedures  Procedures         ED Course  ED Course as of Feb 23 2005 Mon Feb 22, 2021   2104 Pt w/ severe diverticulitis on CT, elev  Wbc at 13,000, and very tender lower abdomen  Will admit for IV abx  D/w Joselyn Ontiveros, admitting under Dr Alfreda Fabry service                                   Default Flowsheet Data (last 720 hours)      Sepsis Reassess     Row Name 02/22/21 2205                   Repeat Volume Status and Tissue Perfusion Assessment Performed    Repeat Volume Status and Tissue Perfusion Assessment Performed  Yes  -LH           Volume Status and Tissue Perfusion Post Fluid Resuscitation * Must Document All *    Vital Signs Reviewed (HR, RR, BP, T)  Yes  -        Shock Index Reviewed  --        Arterial Oxygen Saturation Reviewed (POx, SaO2 or SpO2)  Yes (comment %)  -        Cardio  Normal S1/S2  -        Pulmonary  Normal effort  -        Capillary Refill  Brisk  -        Peripheral Pulses  Dorsalis Pedis  -        Dorsalis Pedis  +2  -        Skin  Warm;Dry  -        Urine output assessed  Adequate  -           *OR*   Intensive Monitoring- Must Document One of the Following Four *:    Vital Signs Reviewed  --        * Central Venous Pressure (CVP or RAP)  --        * Central Venous Oxygen (SVO2, ScvO2 or Oxygen saturation via central catheter)  --        * Bedside Cardiovascular US in IVC diameter and % collapse  --        * Passive Leg Raise OR Crystalloid Challenge  -- User Key  (r) = Recorded By, (t) = Taken By, (c) = Cosigned By    [address]  Name Provider Type    [address], [facility]  Nurse Practitioner        SBIRT 20yo+      Most Recent Value   SBIRT (25 yo +)   In order to provide better care to our patients, we are screening all of our patients for alcohol and drug use  Would it be okay to ask you these screening questions? Yes Filed at: 02/22/2021 2023   Initial Alcohol Screen: US AUDIT-C    1  How often do you have a drink containing alcohol? 3 Filed at: 02/22/2021 2023   2  How many drinks containing alcohol do you have on a typical day you are drinking? 2 Filed at: 02/22/2021 2023   3a  Male UNDER 65: How often do you have five or more drinks on one occasion? 0 Filed at: 02/22/2021 2023   3b  FEMALE Any Age, or MALE 65+: How often do you have 4 or more drinks on one occassion? 0 Filed at: 02/22/2021 2023   Audit-C Score  5 Filed at: 02/22/2021 2023   BROCK: How many times in the past year have you    Used an illegal drug or used a prescription medication for non-medical reasons?   Never Filed at: 02/22/2021 2023                    MDM  Number of Diagnoses or Management Options  Sigmoid diverticulitis: new and requires workup     Amount and/or Complexity of Data Reviewed  Clinical lab tests: ordered and reviewed  Tests in the radiology section of CPT®: ordered and reviewed  Discuss the patient with other providers: yes  Independent visualization of images, tracings, or specimens: yes    Risk of Complications, Morbidity, and/or Mortality  Presenting problems: moderate  Diagnostic procedures: moderate  Management options: moderate    Patient Progress  Patient progress: stable      Disposition  Final diagnoses:   Sigmoid diverticulitis     Time reflects when diagnosis was documented in both MDM as applicable and the Disposition within this note     Time User Action Codes Description Comment    2/22/2021  9:06 PM Patrick Mathews Add [L63 67] Sigmoid diverticulitis 2/22/2021  9:48 PM Iman Samson Add [K57 [id number]] Diverticulitis     2/22/2021  9:48 PM Iman Samson Add [A41 9] Sepsis, due to unspecified organism, unspecified whether acute organ dysfunction present [facility])     2/22/2021  9:48 PM Katherine Loyd [B87 [id number]] Diverticulitis       ED Disposition     ED Disposition Condition Date/Time Comment    Admit Stable Mon Feb 22, 2021  9:06 PM Case was discussed with Glory Marquez and the patient's admission status was agreed to be Admission Status: inpatient status to the service of Dr Alexis Mar          Follow-up Information    None         There are no discharge medications for this patient  No discharge procedures on file      PDMP Review     None          ED Provider  Electronically Signed by           Fya Jones DO  02/23/21 2005

## 2021-02-23 NOTE — CONSULTS
Consultation - [facility]  Jt Zack 52 y o  male MRN: [id number]  Unit/Bed#: S -01 Encounter: [id number]        Inpatient consult to gastroenterology  Consult performed by: Milind Perez PA-C  Consult ordered by: Briseida Yu          Reason for Consult / Principal Problem: Diverticulitis    ASSESSMENT and PLAN:      1) Diverticulitis without complications, first episode: Patient with three day history of abdominal pain and one day history of fevers presented to ED  CT with contrast noting severe stranding around sigmoid colon consistent with diverticulitis  WBC on arrival was 13 13 now 11 25  Lipase normal  CMP unremarkable  UA normal  Patient started on Zosyn and fluids  Patient on clear liquid diet  No nausea/vomiting  Afebrile since arrival  Vital signs now stable  No prior colonoscopy  -Continue abx/fluids   -Continue pain medications and anti-emetics as needed   -Continue clear liquid diet for now  Consider advancing tomorrow if tolerating   -Recommend outpatient colonoscopy in 6-8 weeks   -Abdominal exams to assess for increased pain, distention, nausea, vomiting, fevers  -------------------------------------------------------------------------------------------------------------------    HPI:     This is a 52year old male with no significant PMH who presented to the ED yesterday (2/22) with abdominal pain, fevers and chills x 3 days  Upon arrival to the ED his vital signs were stable, afebrile  He had a WBC of 13 13  CMP normal  Lipase normal at 82  CT with contrast showed severe standing around sigmoid consistent with diverticulitis  Also noted hepatic lesions likely consistent with hepatic cysts which were too small to accurately characterize  He was hypertensive on arrival at 179,46 then had a symptomatic episodes of hypotension with BP 79/46  He was given 3 bolus of NaCl, Zofran and dilaudid  Zosyn was started and he was made NPO   GI was consulted for diverticulitis  Patient reports and onset of lower abdominal pain three days ago (2/20)  He notes the pain was achy, worse in the suprapubic area and constant, with movement making it worse  He took advil with some relief of pain  Yesterday he took his temperature before work and noted it was 100 3  He went to urgent care who reported a temperature of 102 and sent him to the ER  Patient reports that his abdominal pain is much improved from yesterday with a majority of his pain only with movement or palpation  He denies nausea, vomiting, recent weight loss, decreased appetite or previous episodes of diverticulitis  He denies change in bowel movements, blood in stool  He also denies dysuria, hematuria  He drinks one beer almost daily  He smokes 1/2 pack a day for the last 30 years  He has never had a colonoscopy  Reports that his father had prostate and bladder cancer and his maternal grandfather had prostate and GB carcinoma  REVIEW OF SYSTEMS:    CONSTITUTIONAL: Denies any fever, chills, or rigors  Good appetite, and no recent weight loss  HEENT: No earache or tinnitus  Denies hearing loss or visual disturbances  CARDIOVASCULAR: No chest pain or palpitations  RESPIRATORY: Denies any cough, hemoptysis, shortness of breath or dyspnea on exertion  GASTROINTESTINAL: As noted in the History of Present Illness  GENITOURINARY: No problems with urination  Denies any hematuria or dysuria  NEUROLOGIC: No dizziness or vertigo, denies headaches  MUSCULOSKELETAL: Denies any muscle or joint pain  SKIN: Denies skin rashes or itching  ENDOCRINE: Denies excessive thirst  Denies intolerance to heat or cold  PSYCHOSOCIAL: Denies depression or anxiety  Denies any recent memory loss  Historical Information   History reviewed  No pertinent past medical history  History reviewed  No pertinent surgical history    Social History   Social History     Substance and Sexual Activity   Alcohol Use Never    Frequency: Never     Social History     Substance and Sexual Activity   Drug Use Never     Social History     Tobacco Use   Smoking Status Never Smoker   Smokeless Tobacco Never Used     History reviewed  No pertinent family history  Meds/Allergies     No medications prior to admission  Current Facility-Administered Medications   Medication Dose Route Frequency    acetaminophen (TYLENOL) tablet 650 mg  650 mg Oral Q6H PRN    enoxaparin (LOVENOX) subcutaneous injection 40 mg  40 mg Subcutaneous Daily    HYDROmorphone (DILAUDID) injection 0 2 mg  0 2 mg Intravenous Q4H PRN    lactated ringers infusion  125 mL/hr Intravenous Continuous    ondansetron (ZOFRAN) injection 4 mg  4 mg Intravenous Once    ondansetron (ZOFRAN) injection 4 mg  4 mg Intravenous Q6H PRN    oxyCODONE (ROXICODONE) IR tablet 2 5 mg  2 5 mg Oral Q4H PRN    oxyCODONE (ROXICODONE) IR tablet 5 mg  5 mg Oral Q4H PRN    piperacillin-tazobactam (ZOSYN) 3 375 g in sodium chloride 0 9 % 100 mL IVPB  3 375 g Intravenous Q6H       No Known Allergies        Objective     Blood pressure 137/79, pulse 81, temperature 99 2 °F (37 3 °C), temperature source Oral, resp  rate 16, height 5' 10" (1 778 m), weight 101 kg (222 lb 10 6 oz), SpO2 97 %  Intake/Output Summary (Last 24 hours) at 2/23/2021 6959  Last data filed at 2/23/2021 0015  Gross per 24 hour   Intake 3100 ml   Output --   Net 3100 ml         PHYSICAL EXAM:      General Appearance:   Alert, cooperative, no distress, appears stated age  HEENT:   Normocephalic, atraumatic, anicteric, no oropharyngeal thrush present      Neck:  Supple, symmetrical, trachea midline, no adenopathy;    thyroid: no enlargement/tenderness/nodules; no carotid  bruit or JVD    Lungs:   Clear to auscultation bilaterally; no rales, rhonchi or wheezing; respirations unlabored    Heart[de-identified]   S1 and S2 normal; regular rate and rhythm; no murmur, rub, or gallop     Abdomen:   Tender to palpation in RLQ/suprapubic and LLQ  Soft, non-distended; normal bowel sounds; no masses, no organomegaly  Genitalia:   Deferred    Rectal:   Deferred    Extremities:  No cyanosis, clubbing or edema    Pulses:  2+ and symmetric all extremities    Skin:  Skin color, texture, turgor normal, no rashes or lesions    Lymph nodes:  No palpable cervical, axillary or inguinal lymphadenopathy        Lab Results:   Results from last 7 days   Lab Units 02/23/21  0501   WBC Thousand/uL 11 25*   HEMOGLOBIN g/dL 13 5   HEMATOCRIT % 40 8   PLATELETS Thousands/uL 212   NEUTROS PCT % 74   LYMPHS PCT % 13*   MONOS PCT % 12   EOS PCT % 0     Results from last 7 days   Lab Units 02/23/21  0501   POTASSIUM mmol/L 3 9   CHLORIDE mmol/L 107   CO2 mmol/L 22   BUN mg/dL 7   CREATININE mg/dL 0 72   CALCIUM mg/dL 7 8*   ALK PHOS U/L 64   ALT U/L 23   AST U/L 21     Results from last 7 days   Lab Units 02/22/21  1905   INR  1 04     Results from last 7 days   Lab Units 02/22/21  1905   LIPASE u/L 82       Imaging Studies: I have personally reviewed pertinent imaging studies  Ct Abdomen Pelvis With Contrast    Result Date: 2/22/2021  Impression: Sigmoid diverticulitis  Workstation performed: [id number]           Patient was seen and examined by Dr Lianne Monsalve  All espinoza medical decisions were made by Dr Lianne Monsalve  Thank you for allowing us to participate in the care of this present patient  We will follow-up with you closely

## 2021-02-23 NOTE — ASSESSMENT & PLAN NOTE
· Likely secondary to severe sepsis    So far, received 1 L fluid bolus with improvement in blood pressure  · Check orthostatic BP  · Fall precaution  · IV fluids as outlined above

## 2021-02-23 NOTE — PLAN OF CARE
Problem: GASTROINTESTINAL - ADULT  Goal: Minimal or absence of nausea and/or vomiting  Description: INTERVENTIONS:  - Administer IV fluids if ordered to ensure adequate hydration  - Maintain NPO status until nausea and vomiting are resolved  - Nasogastric tube if ordered  - Administer ordered antiemetic medications as needed  - Provide nonpharmacologic comfort measures as appropriate  - Advance diet as tolerated, if ordered  - Consider nutrition services referral to assist patient with adequate nutrition and appropriate food choices  Outcome: Progressing  Goal: Maintains or returns to baseline bowel function  Description: INTERVENTIONS:  - Assess bowel function  - Encourage oral fluids to ensure adequate hydration  - Administer IV fluids if ordered to ensure adequate hydration  - Administer ordered medications as needed  - Encourage mobilization and activity  - Consider nutritional services referral to assist patient with adequate nutrition and appropriate food choices  Outcome: Progressing  Goal: Maintains adequate nutritional intake  Description: INTERVENTIONS:  - Monitor percentage of each meal consumed  - Identify factors contributing to decreased intake, treat as appropriate  - Assist with meals as needed  - Monitor I&O, weight, and lab values if indicated  - Obtain nutrition services referral as needed  Outcome: Progressing     Problem: GENITOURINARY - ADULT  Goal: Maintains or returns to baseline urinary function  Description: INTERVENTIONS:  - Assess urinary function  - Encourage oral fluids to ensure adequate hydration if ordered  - Administer IV fluids as ordered to ensure adequate hydration  - Administer ordered medications as needed  - Offer frequent toileting  - Follow urinary retention protocol if ordered  Outcome: Progressing  Goal: Absence of urinary retention  Description: INTERVENTIONS:  - Assess patients ability to void and empty bladder  - Monitor I/O  - Bladder scan as needed  - Discuss with physician/AP medications to alleviate retention as needed  - Discuss catheterization for long term situations as appropriate  Outcome: Progressing  Goal: Urinary catheter remains patent  Description: INTERVENTIONS:  - Assess patency of urinary catheter  - If patient has a chronic aparicio, consider changing catheter if non-functioning  - Follow guidelines for intermittent irrigation of non-functioning urinary catheter  Outcome: Progressing

## 2021-02-23 NOTE — UTILIZATION REVIEW
Notification of Inpatient Admission/Inpatient Authorization Request   This is a Notification of Inpatient Admission for [address]  Be advised that this patient was admitted to our facility under Inpatient Status  Contact Claire Rboerts at [phone] for additional admission information  [address] UR DEPT  DEDICATED [phone]  Patient Name:   Dolores Pan   YOB: 1970       [address]   [address]:   Robby Fournier  Tax ID: [id number]  NPI: [id number] Attending Provider/NPI:  Address:  Phone: Mehreen Chowdary, Reji Hernandez [[id number]]  Same as the facility  [phone]   Place of Service Code: 24 Place of Service Name:  [address] [place] [address]   Start Date: 2/22/21 2108     Discharge Date & Time: No discharge date for patient encounter  Type of Admission: Inpatient Status Discharge Disposition   (if discharged): Final discharge disposition not confirmed   Patient Diagnoses: Abdominal pain [R10 9]  Sigmoid diverticulitis [K57 32]  Fever [R50 9]  Sepsis, due to unspecified organism, unspecified whether acute organ dysfunction present [facility]) [A41 9]     Orders: Admission Orders (From admission, onward)     Ordered        02/22/21 2108  Inpatient Admission  Once                    Assigned Utilization Review Contact: Claire Roberts  Utilization   Network Utilization Review Department  Phone: [phone]; Fax [phone]  Email: [email or web address]   ATTENTION PAYERS: Please call the assigned Utilization  directly with any questions or concerns ALL voicemails in the department are confidential  Send all requests for admission clinical reviews, approved or denied determinations and any other requests to dedicated fax number belonging to the campus where the patient is receiving treatment

## 2021-02-23 NOTE — PROGRESS NOTES
Progress Note Hillary Arzate 1970, 52 y o  male MRN: [id number]    Unit/Bed#: S -01 Encounter: [id number]    Primary Care Provider: Anyi Victor MD   Date and time admitted to hospital: 2/22/2021  7:30 PM    * Severe sepsis [facility])  Assessment & Plan   SIRS criteria:  Pre-hospital temperature 102°, leukocytosis, tachycardia   Suspected source:  Sigmoid diverticulitis   UA:  Negative   Lactic acid:  wnl    COVID negative on 02/20   IV Fluids: s/p 30 mL per kg fluid bolus with frequent vital signs   continue with LR at 125 cc/hour   IV antibiotics:  Zosyn 3 375 g every 6 hours   Blood cultures:  pending      Diverticulitis  Assessment & Plan  · Presentation:  1 day history of bilateral lower quadrant (worsen in left lower quadrant), fever  Seen at urgent care in advised evaluation in the emergency department  · Denies prior history of diverticulitis  Denies prior colonoscopy  · CT abdomen pelvis:  Sigmoid diverticulitis  · Antibiotics:  As above  · Serial abdominal exams  · GI consultation    Hypotension  Assessment & Plan  · Likely secondary to severe sepsis  S/p 1 L fluid bolus with improvement in blood pressure  · Check orthostatic BP  · Fall precaution  · IV fluids as outlined above        VTE Pharmacologic Prophylaxis:   Pharmacologic: Enoxaparin (Lovenox)  Mechanical VTE Prophylaxis in Place: No    Patient Centered Rounds: I have performed bedside rounds with nursing staff today  Discussions with Specialists or Other Care Team Provider:     Education and Discussions with Family / Patient: patient     Time Spent for Care: 30 minutes  More than 50% of total time spent on counseling and coordination of care as described above      Current Length of Stay: 1 day(s)    Current Patient Status: Inpatient   Certification Statement: The patient will continue to require additional inpatient hospital stay due to sepsis d/t diverticulitis     Discharge Plan / Estimated Discharge Date: TBD based on clinical course; suspect 24 hours    Code Status: Level 1 - Full Code    Subjective:   Pt is feeling better  Still has tenderness, but improved from yesterday  No LH  Is hungry     Objective:     Vitals:   Temp (24hrs), Av °F (37 2 °C), Min:98 °F (36 7 °C), Max:99 8 °F (37 7 °C)    Temp:  [98 °F (36 7 °C)-99 8 °F (37 7 °C)] 99 2 °F (37 3 °C)  HR:  [] 81  Resp:  [12-18] 16  BP: ()/(46-87) 137/79  SpO2:  [94 %-99 %] 97 %  Body mass index is 31 95 kg/m²  Input and Output Summary (last 24 hours): Intake/Output Summary (Last 24 hours) at 2021 0901  Last data filed at 2021 0015  Gross per 24 hour   Intake 3100 ml   Output --   Net 3100 ml       Physical Exam:     Physical Exam  Vitals signs and nursing note reviewed  Constitutional:       Appearance: Normal appearance  HENT:      Head: Normocephalic and atraumatic  Cardiovascular:      Rate and Rhythm: Normal rate and regular rhythm  Heart sounds: No murmur  Pulmonary:      Effort: Pulmonary effort is normal  No respiratory distress  Breath sounds: Normal breath sounds  No wheezing  Abdominal:      General: Abdomen is flat  Palpations: Abdomen is soft  Tenderness: There is abdominal tenderness (RLQ )  There is no guarding or rebound  Neurological:      General: No focal deficit present  Mental Status: He is alert and oriented to person, place, and time             Additional Data:     Labs:    Results from last 7 days   Lab Units 21  0501   WBC Thousand/uL 11 25*   HEMOGLOBIN g/dL 13 5   HEMATOCRIT % 40 8   PLATELETS Thousands/uL 212   NEUTROS PCT % 74   LYMPHS PCT % 13*   MONOS PCT % 12   EOS PCT % 0     Results from last 7 days   Lab Units 21  0501   POTASSIUM mmol/L 3 9   CHLORIDE mmol/L 107   CO2 mmol/L 22   BUN mg/dL 7   CREATININE mg/dL 0 72   CALCIUM mg/dL 7 8*   ALK PHOS U/L 64   ALT U/L 23   AST U/L 21     Results from last 7 days   Lab Units 21  1905 INR  1 04       * I Have Reviewed All Lab Data Listed Above  * Additional Pertinent Lab Tests Reviewed: All Labs Within Last 24 Hours Reviewed    Imaging:    Imaging Reports Reviewed Today Include:   Imaging Personally Reviewed by Myself Includes:      Recent Cultures (last 7 days):     Results from last 7 days   Lab Units 02/22/21 2204 02/22/21  1903   BLOOD CULTURE  Received in Microbiology Lab  Culture in Progress  Received in Microbiology Lab  Culture in Progress  Last 24 Hours Medication List:   Current Facility-Administered Medications   Medication Dose Route Frequency Provider Last Rate    acetaminophen  650 mg Oral Q6H PRN Adelina Leaver, CRNP      enoxaparin  40 mg Subcutaneous Daily Adelina Leaver, CRNP      HYDROmorphone  0 2 mg Intravenous Q4H PRN Adelina Leaver, CRNP      lactated ringers  125 mL/hr Intravenous Continuous Adelina Leaver, CRNP 125 mL/hr (02/23/21 0122)    ondansetron  4 mg Intravenous Once Vessie Miley, DO      ondansetron  4 mg Intravenous Q6H PRN Adelina Leaver, CRNP      oxyCODONE  2 5 mg Oral Q4H PRN Adelina Leaver, CRNP      oxyCODONE  5 mg Oral Q4H PRN Adelina Leaver, CRNP      piperacillin-tazobactam  3 375 g Intravenous Q6H Adelina Leaver, CRNP 3 375 g (02/23/21 4477)        Today, Patient Was Seen By: Rylie Rosa MD    ** Please Note: This note has been constructed using a voice recognition system   **

## 2021-02-23 NOTE — ASSESSMENT & PLAN NOTE
 SIRS criteria:  Pre-hospital temperature 102°, leukocytosis, tachycardia   Suspected source:  Sigmoid diverticulitis   UA:  Negative   Lactic acid:  wnl    COVID negative on 02/20   IV Fluids: s/p 30 mL per kg fluid bolus with frequent vital signs   continue with LR at 125 cc/hour     IV antibiotics:  Zosyn 3 375 g every 6 hours   Blood cultures:  pending

## 2021-02-23 NOTE — ASSESSMENT & PLAN NOTE
· Presentation:  1 day history of bilateral lower quadrant (worsen in left lower quadrant), fever  Seen at urgent care in advised evaluation in the emergency department  · Denies prior history of diverticulitis  Denies prior colonoscopy  · CT abdomen pelvis:  Sigmoid diverticulitis    · Antibiotics:  As above  · Serial abdominal exams  · GI consultation

## 2021-02-23 NOTE — ASSESSMENT & PLAN NOTE
 SIRS criteria:  Pre-hospital temperature 102°, leukocytosis, tachycardia   Suspected source:  Sigmoid diverticulitis   UA:  Negative   Lactic acid:  Obtain stat   End organ damage:  Blood pressure 79/46  Responsive to IV fluids   COVID negative on 02/20   IV Fluids:  30 mL per kg fluid bolus with frequent vital signs  And continue with LR at 125 cc/hour     IV antibiotics:  Zosyn 3 375 g every 6 hours   Blood cultures:  Obtain stat

## 2021-02-24 VITALS
OXYGEN SATURATION: 95 % | SYSTOLIC BLOOD PRESSURE: 147 MMHG | BODY MASS INDEX: 31.32 KG/M2 | DIASTOLIC BLOOD PRESSURE: 81 MMHG | HEIGHT: 70 IN | WEIGHT: 218.8 LBS | HEART RATE: 72 BPM | RESPIRATION RATE: 19 BRPM | TEMPERATURE: 98.2 F

## 2021-02-24 LAB — PROCALCITONIN SERPL-MCNC: <0.05 NG/ML

## 2021-02-24 PROCEDURE — 99239 HOSP IP/OBS DSCHRG MGMT >30: CPT | Performed by: HOSPITALIST

## 2021-02-24 PROCEDURE — 84145 PROCALCITONIN (PCT): CPT | Performed by: NURSE PRACTITIONER

## 2021-02-24 RX ORDER — METRONIDAZOLE 500 MG/1
500 TABLET ORAL EVERY 8 HOURS SCHEDULED
Qty: 21 TABLET | Refills: 0 | Status: SHIPPED | OUTPATIENT
Start: 2021-02-24 | End: 2021-03-03

## 2021-02-24 RX ORDER — CEFDINIR 300 MG/1
300 CAPSULE ORAL EVERY 12 HOURS SCHEDULED
Qty: 14 CAPSULE | Refills: 0 | Status: SHIPPED | OUTPATIENT
Start: 2021-02-24 | End: 2021-03-03

## 2021-02-24 RX ADMIN — PIPERACILLIN AND TAZOBACTAM 3.38 G: 36; 4.5 INJECTION, POWDER, FOR SOLUTION INTRAVENOUS at 04:18

## 2021-02-24 RX ADMIN — PIPERACILLIN AND TAZOBACTAM 3.38 G: 36; 4.5 INJECTION, POWDER, FOR SOLUTION INTRAVENOUS at 10:11

## 2021-02-24 NOTE — ASSESSMENT & PLAN NOTE
· Likely secondary to severe sepsis    S/p 1 L fluid bolus with improvement in blood pressure  · Orthostatic blood pressures were negative

## 2021-02-24 NOTE — DISCHARGE INSTR - AVS FIRST PAGE
Dear Obed Martinez,     It was our pleasure to care for you here at [facility]  It is our hope that we were always able to exceed the expected standards for your care during your stay  You were hospitalized due to diverticulitis  You were cared for on the 3rd floor under the service of Berto Le MD with the [facility] who covers for your primary care physician (PCP), Roseanna Layne MD, while you were hospitalized  If you have any questions or concerns related to this hospitalization, you may contact us at [phone]  For follow up as well as medication refills, we recommend that you follow up with your primary care physician  A registered nurse will reach out to you by phone within a few days after your discharge to answer any additional questions that you may have after going home  However, at this time we provide for you here, the most important instructions / recommendations at discharge:     · Notable Medication Adjustments -   · cefdinir twice daily  · Metronidazole three times daily   · Testing Required after Discharge -   · colonoscopy   · Important follow up information -   · Follow-up with GI   · Other Instructions -   · Low residue/low fiber diet until symptoms fully resolve  Then start a high fiber diet  · Please review this entire after visit summary as additional general instructions including medication list, appointments, activity, diet, any pertinent wound care, and other additional recommendations from your care team that may be provided for you        Sincerely,     Berto Le MD

## 2021-02-24 NOTE — ASSESSMENT & PLAN NOTE
· Presentation:  1 day history of bilateral lower quadrant (worsen in left lower quadrant), fever  Seen at urgent care in advised evaluation in the emergency department  · Denies prior history of diverticulitis  Denies prior colonoscopy  · CT abdomen pelvis:  Sigmoid diverticulitis    · Antibiotics:  As above  · Serial abdominal exams  · GI consultation-> plan for outpatient colonoscopy

## 2021-02-24 NOTE — PLAN OF CARE
Problem: GASTROINTESTINAL - ADULT  Goal: Minimal or absence of nausea and/or vomiting  Description: INTERVENTIONS:  - Administer IV fluids if ordered to ensure adequate hydration  - Maintain NPO status until nausea and vomiting are resolved  - Nasogastric tube if ordered  - Administer ordered antiemetic medications as needed  - Provide nonpharmacologic comfort measures as appropriate  - Advance diet as tolerated, if ordered  - Consider nutrition services referral to assist patient with adequate nutrition and appropriate food choices  Outcome: Progressing  Goal: Maintains or returns to baseline bowel function  Description: INTERVENTIONS:  - Assess bowel function  - Encourage oral fluids to ensure adequate hydration  - Administer IV fluids if ordered to ensure adequate hydration  - Administer ordered medications as needed  - Encourage mobilization and activity  - Consider nutritional services referral to assist patient with adequate nutrition and appropriate food choices  Outcome: Progressing  Goal: Maintains adequate nutritional intake  Description: INTERVENTIONS:  - Monitor percentage of each meal consumed  - Identify factors contributing to decreased intake, treat as appropriate  - Assist with meals as needed  - Monitor I&O, weight, and lab values if indicated  - Obtain nutrition services referral as needed  Outcome: Progressing     Problem: GENITOURINARY - ADULT  Goal: Maintains or returns to baseline urinary function  Description: INTERVENTIONS:  - Assess urinary function  - Encourage oral fluids to ensure adequate hydration if ordered  - Administer IV fluids as ordered to ensure adequate hydration  - Administer ordered medications as needed  - Offer frequent toileting  - Follow urinary retention protocol if ordered  Outcome: Progressing  Goal: Absence of urinary retention  Description: INTERVENTIONS:  - Assess patients ability to void and empty bladder  - Monitor I/O  - Bladder scan as needed  - Discuss with physician/AP medications to alleviate retention as needed  - Discuss catheterization for long term situations as appropriate  Outcome: Progressing  Goal: Urinary catheter remains patent  Description: INTERVENTIONS:  - Assess patency of urinary catheter  - If patient has a chronic aparicio, consider changing catheter if non-functioning  - Follow guidelines for intermittent irrigation of non-functioning urinary catheter  Outcome: Progressing     Problem: Potential for Falls  Goal: Patient will remain free of falls  Description: INTERVENTIONS:  - Assess patient frequently for physical needs  -  Identify cognitive and physical deficits and behaviors that affect risk of falls    -  [place] fall precautions as indicated by assessment   - Educate patient/family on patient safety including physical limitations  - Instruct patient to call for assistance with activity based on assessment  - Modify environment to reduce risk of injury  - Consider OT/PT consult to assist with strengthening/mobility  Outcome: Progressing

## 2021-02-24 NOTE — ASSESSMENT & PLAN NOTE
 SIRS criteria:  Pre-hospital temperature 102°, leukocytosis, tachycardia   Suspected source:  Sigmoid diverticulitis   Patient's sepsis physiology resolved fairly quickly   Tolerated IV Zosyn in the hospital, was transitioned to oral Omnicef and Flagyl on discharge   Plan to complete 7 additional days of oral abx on dc    Discussed low-fiber/low residue diet until symptoms fully resolved   Patient to follow-up with GI for outpatient colonoscopy in 6-8 weeks

## 2021-02-24 NOTE — PLAN OF CARE
Problem: GASTROINTESTINAL - ADULT  Goal: Minimal or absence of nausea and/or vomiting  Description: INTERVENTIONS:  - Administer IV fluids if ordered to ensure adequate hydration  - Maintain NPO status until nausea and vomiting are resolved  - Nasogastric tube if ordered  - Administer ordered antiemetic medications as needed  - Provide nonpharmacologic comfort measures as appropriate  - Advance diet as tolerated, if ordered  - Consider nutrition services referral to assist patient with adequate nutrition and appropriate food choices  2/24/2021 1118 by Prince Hood RN  Outcome: Adequate for Discharge  2/24/2021 1024 by Prince Hood RN  Outcome: Progressing  Goal: Maintains or returns to baseline bowel function  Description: INTERVENTIONS:  - Assess bowel function  - Encourage oral fluids to ensure adequate hydration  - Administer IV fluids if ordered to ensure adequate hydration  - Administer ordered medications as needed  - Encourage mobilization and activity  - Consider nutritional services referral to assist patient with adequate nutrition and appropriate food choices  2/24/2021 1118 by Prince Hood RN  Outcome: Adequate for Discharge  2/24/2021 1024 by Prince Hood RN  Outcome: Progressing  Goal: Maintains adequate nutritional intake  Description: INTERVENTIONS:  - Monitor percentage of each meal consumed  - Identify factors contributing to decreased intake, treat as appropriate  - Assist with meals as needed  - Monitor I&O, weight, and lab values if indicated  - Obtain nutrition services referral as needed  2/24/2021 1118 by Prince Hood RN  Outcome: Adequate for Discharge  2/24/2021 1024 by Prince Hood RN  Outcome: Progressing     Problem: GENITOURINARY - ADULT  Goal: Maintains or returns to baseline urinary function  Description: INTERVENTIONS:  - Assess urinary function  - Encourage oral fluids to ensure adequate hydration if ordered  - Administer IV fluids as ordered to ensure adequate hydration  - Administer ordered medications as needed  - Offer frequent toileting  - Follow urinary retention protocol if ordered  2/24/2021 1118 by Prince Hood RN  Outcome: Adequate for Discharge  2/24/2021 1024 by Prince Hood RN  Outcome: Progressing  Goal: Absence of urinary retention  Description: INTERVENTIONS:  - Assess patients ability to void and empty bladder  - Monitor I/O  - Bladder scan as needed  - Discuss with physician/AP medications to alleviate retention as needed  - Discuss catheterization for long term situations as appropriate  2/24/2021 1118 by Prince Hood RN  Outcome: Adequate for Discharge  2/24/2021 1024 by Prince Hood RN  Outcome: Progressing  Goal: Urinary catheter remains patent  Description: INTERVENTIONS:  - Assess patency of urinary catheter  - If patient has a chronic aparicio, consider changing catheter if non-functioning  - Follow guidelines for intermittent irrigation of non-functioning urinary catheter  2/24/2021 1118 by Prince Hood RN  Outcome: Adequate for Discharge  2/24/2021 1024 by Prince Hood RN  Outcome: Progressing     Problem: Potential for Falls  Goal: Patient will remain free of falls  Description: INTERVENTIONS:  - Assess patient frequently for physical needs  -  Identify cognitive and physical deficits and behaviors that affect risk of falls    -  [place] fall precautions as indicated by assessment   - Educate patient/family on patient safety including physical limitations  - Instruct patient to call for assistance with activity based on assessment  - Modify environment to reduce risk of injury  - Consider OT/PT consult to assist with strengthening/mobility  2/24/2021 1118 by Prince Hood RN  Outcome: Adequate for Discharge  2/24/2021 1024 by Prince Hood RN  Outcome: Progressing

## 2021-02-24 NOTE — UTILIZATION REVIEW
Notification of Discharge  This is a Notification of Discharge from our facility [address]  Please be advised that this patient has been discharge from our facility  Below you will find the admission and discharge date and time including the patients disposition  PRESENTATION DATE: 2/22/2021  7:30 PM  OBS ADMISSION DATE:   IP ADMISSION DATE: 2/22/21 2108   DISCHARGE DATE: 2/24/2021 12:05 PM  DISPOSITION: Home/Self Care Home/Self Care   Admission Orders listed below:  Admission Orders (From admission, onward)     Ordered        02/22/21 2108  Inpatient Admission  Once                   Please contact the UR Department if additional information is required to close this patient's authorization/case  [address] Utilization Review Department  Main: [phone] x carefully listen to the prompts  All voicemails are confidential   [email or web address]  Send all requests for admission clinical reviews, approved or denied determinations and any other requests to dedicated fax number below belonging to the campus where the patient is receiving treatment   List of dedicated fax numbers:  [address] DENIALS (Administrative/Medical Necessity) [phone]   [address] (Maternity/NICU/Pediatrics) [phone]   [facility] [phone]   [facility] [phone]   [facility] [phone]   [place] Gu[place] [address] [phone]   [facility]  [phone]   [address] [facility] [address]  [address] [address] [address] [facility] [phone]

## 2021-02-24 NOTE — DISCHARGE SUMMARY
Discharge- Joni Bath 1970, 52 y o  male MRN: [id number]    Unit/Bed#: S -01 Encounter: [id number]    Primary Care Provider: Cecille Guzman MD   Date and time admitted to hospital: 2/22/2021  7:30 PM    * Severe sepsis [facility])  Assessment & Plan   SIRS criteria:  Pre-hospital temperature 102°, leukocytosis, tachycardia   Suspected source:  Sigmoid diverticulitis   Patient's sepsis physiology resolved fairly quickly   Tolerated IV Zosyn in the hospital, was transitioned to oral Omnicef and Flagyl on discharge   Plan to complete 7 additional days of oral abx on dc    Discussed low-fiber/low residue diet until symptoms fully resolved   Patient to follow-up with GI for outpatient colonoscopy in 6-8 weeks  Diverticulitis  Assessment & Plan  · Presentation:  1 day history of bilateral lower quadrant (worsen in left lower quadrant), fever  Seen at urgent care in advised evaluation in the emergency department  · Denies prior history of diverticulitis  Denies prior colonoscopy  · CT abdomen pelvis:  Sigmoid diverticulitis  · Antibiotics:  As above  · Serial abdominal exams  · GI consultation-> plan for outpatient colonoscopy    Hypotension  Assessment & Plan  · Likely secondary to severe sepsis  S/p 1 L fluid bolus with improvement in blood pressure  · Orthostatic blood pressures were negative       Discharging Physician / Practitioner: Meliza Lowe MD  PCP: Cecille Guzman MD  Admission Date:   Admission Orders (From admission, onward)     Ordered        02/22/21 2108  Inpatient Admission  Once                   Discharge Date: 02/24/21    Resolved Problems  Date Reviewed: 2/22/2021    None          Consultations During Hospital Stay:  · GI    Procedures Performed:   · None    Significant Findings / Test Results:   CT abdomen  IMPRESSION:  Sigmoid diverticulitis  Incidental Findings:   · None      Test Results Pending at Discharge (will require follow up):    · None Outpatient Tests Requested:  · Colonoscopy in 6-8 weeks     Complications:  None     Reason for Admission: diverticulis     Hospital Course: Johann Boykin is a 52 y o  male patient who originally presented to the hospital on 2/22/2021 due to 1 day history of abdominal pain and fever of 102  Patient initially seen in urgent care it was recommended to present to the ED  CT imaging in the ED showed sigmoid diverticulitis  Patient initially was stable however had 1 episode of hypotension and concern for worsening sepsis physiology  Patient's blood pressures improved with IV fluids  Patient started on IV Zosyn and had no further episodes of hypotension  Patient's orthostatic vital signs were checked and were negative  Patient's diet was gradually advanced he reported resolution of his abdominal pain  Patient was discharged on oral antibiotics (Omnicef and Flagyl)  Plan to complete 7 additional days of antibiotics  Patient was seen by GI who recommended outpatient colonoscopy in 6-8 weeks  Please see above list of diagnoses and related plan for additional information  Condition at Discharge: good     Discharge Day Visit / Exam:     Subjective:  Patient feels well  Tolerating oral diet  Vitals: Blood Pressure: 147/81 (02/24/21 0652)  Pulse: 72 (02/24/21 0652)  Temperature: 98 2 °F (36 8 °C) (02/24/21 0627)  Temp Source: Oral (02/24/21 9049)  Respirations: 19 (02/24/21 0627)  Height: 5' 10" (177 8 cm) (02/22/21 2119)  Weight - Scale: 99 2 kg (218 lb 12 8 oz) (02/24/21 0537)  SpO2: 95 % (02/24/21 8144)  Exam:   Physical Exam  Vitals signs and nursing note reviewed  Constitutional:       General: He is not in acute distress  Appearance: Normal appearance  He is not ill-appearing or toxic-appearing  HENT:      Head: Normocephalic and atraumatic  Pulmonary:      Effort: Pulmonary effort is normal  No respiratory distress  Breath sounds: No wheezing or rhonchi     Abdominal: General: Abdomen is flat  There is no distension  Palpations: Abdomen is soft  Tenderness: There is no abdominal tenderness  Musculoskeletal: Normal range of motion  Skin:     General: Skin is warm and dry  Neurological:      General: No focal deficit present  Mental Status: He is alert and oriented to person, place, and time  Psychiatric:         Mood and Affect: Mood normal          Behavior: Behavior normal          Discharge instructions/Information to patient and family:   See after visit summary for information provided to patient and family  Provisions for Follow-Up Care:  See after visit summary for information related to follow-up care and any pertinent home health orders  Disposition:     Home    Planned Readmission: none      Discharge Statement:  I spent 35 minutes discharging the patient  This time was spent on the day of discharge  I had direct contact with the patient on the day of discharge  Greater than 50% of the total time was spent examining patient, answering all patient questions, arranging and discussing plan of care with patient as well as directly providing post-discharge instructions  Additional time then spent on discharge activities  Discharge Medications:  See after visit summary for reconciled discharge medications provided to patient and family        ** Please Note: This note has been constructed using a voice recognition system **

## 2021-02-26 LAB
ATRIAL RATE: 90 BPM
P AXIS: 65 DEGREES
PR INTERVAL: 140 MS
QRS AXIS: 52 DEGREES
QRSD INTERVAL: 80 MS
QT INTERVAL: 336 MS
QTC INTERVAL: 411 MS
T WAVE AXIS: 35 DEGREES
VENTRICULAR RATE: 90 BPM

## 2021-02-26 PROCEDURE — 93010 ELECTROCARDIOGRAM REPORT: CPT | Performed by: INTERNAL MEDICINE

## 2021-02-28 LAB
BACTERIA BLD CULT: NORMAL
BACTERIA BLD CULT: NORMAL

## 2021-03-04 ENCOUNTER — OFFICE VISIT (OUTPATIENT)
Dept: GASTROENTEROLOGY | Facility: AMBULARY SURGERY CENTER | Age: 51
End: 2021-03-04
Payer: COMMERCIAL

## 2021-03-04 VITALS
WEIGHT: 219.2 LBS | SYSTOLIC BLOOD PRESSURE: 182 MMHG | BODY MASS INDEX: 31.38 KG/M2 | HEIGHT: 70 IN | DIASTOLIC BLOOD PRESSURE: 86 MMHG

## 2021-03-04 DIAGNOSIS — K21.9 GASTROESOPHAGEAL REFLUX DISEASE, UNSPECIFIED WHETHER ESOPHAGITIS PRESENT: ICD-10-CM

## 2021-03-04 DIAGNOSIS — K57.32 DIVERTICULITIS OF LARGE INTESTINE WITHOUT PERFORATION OR ABSCESS WITHOUT BLEEDING: Primary | ICD-10-CM

## 2021-03-04 PROCEDURE — 99214 OFFICE O/P EST MOD 30 MIN: CPT | Performed by: PHYSICIAN ASSISTANT

## 2021-03-04 RX ORDER — SODIUM, POTASSIUM,MAG SULFATES 17.5-3.13G
2 SOLUTION, RECONSTITUTED, ORAL ORAL SEE ADMIN INSTRUCTIONS
Qty: 2 BOTTLE | Refills: 0 | Status: SHIPPED | OUTPATIENT
Start: 2021-03-04 | End: 2021-04-30

## 2021-03-04 NOTE — PROGRESS NOTES
Follow-up Note -  [facility]  Conrad Terry 1970 52 y o  male         Reason:  Follow-up; recent hospitalization with diverticulitis (1st episode), longstanding intermittent loose stools, GERD    HPI:  79-year-old male with history of tobacco abuse who presents for follow-up, he was hospitalized at the end of February (2/22) after presenting with approximately 3 days of lower abdominal pain, fever and chills, with CT scan notable for severe inflammatory stranding about the sigmoid colon consistent with diverticulitis  This is patient's 1st known episode of such  Patient was treated with antibiotics and bowel rest, he was recommended for outpatient colonoscopy in 6-8 weeks as he had stated he had never had a colonoscopy before  At this time the patient says he is feeling much better, he does finished course of cefdinir and Flagyl yesterday  He says he still has some mild lower abdominal discomfort intermittently, denies any rectal bleeding or melena, reports his bowel habits are generally regular  He says that in the long-term, he does occasionally have issues with loose stool and urgency, generally has about 2 bowel movements a day  Denies any specific dietary triggers to his symptoms  He says he also sometimes gets some substernal burning discomfort which is helped with antacid, and is not worsened exertion only  Denies any difficulty swallowing, any loss of appetite or unintentional weight loss, denies any family history of colon cancer or stomach cancer  REVIEW OF SYSTEMS:      CONSTITUTIONAL: Denies any fever, chills, or rigors  Good appetite, and no recent weight loss  HEENT: No earache or tinnitus  Denies hearing loss or visual disturbances  CARDIOVASCULAR: No chest pain or palpitations  RESPIRATORY: Denies any cough, hemoptysis, shortness of breath or dyspnea on exertion  GASTROINTESTINAL: As noted in the History of Present Illness     GENITOURINARY: No problems with urination  Denies any hematuria or dysuria  NEUROLOGIC: No dizziness or vertigo, denies headaches  MUSCULOSKELETAL: Denies any muscle or joint pain  SKIN: Denies skin rashes or itching  ENDOCRINE: Denies excessive thirst  Denies intolerance to heat or cold  PSYCHOSOCIAL: Denies depression or anxiety  Denies any recent memory loss  Past Medical History:   Diagnosis Date    Diverticulitis of colon       History reviewed  No pertinent surgical history    Social History     Socioeconomic History    Marital status: Single     Spouse name: Not on file    Number of children: Not on file    Years of education: Not on file    Highest education level: Not on file   Occupational History    Not on file   Social Needs    Financial resource strain: Not on file    Food insecurity     Worry: Not on file     Inability: Not on file    Transportation needs     Medical: Not on file     Non-medical: Not on file   Tobacco Use    Smoking status: Current Every Day Smoker    Smokeless tobacco: Never Used   Substance and Sexual Activity    Alcohol use: Yes     Frequency: Never     Comment: occasional    Drug use: Never    Sexual activity: Not on file   Lifestyle    Physical activity     Days per week: Not on file     Minutes per session: Not on file    Stress: Not on file   Relationships    Social connections     Talks on phone: Not on file     Gets together: Not on file     Attends Anabaptism service: Not on file     Active member of club or organization: Not on file     Attends meetings of clubs or organizations: Not on file     Relationship status: Not on file    Intimate partner violence     Fear of current or ex partner: Not on file     Emotionally abused: Not on file     Physically abused: Not on file     Forced sexual activity: Not on file   Other Topics Concern    Not on file   Social History Narrative    Not on file     Family History   Problem Relation Age of Onset    Prostate cancer Father    [facility] Cancer Maternal Grandfather      Patient has no known allergies  Current Outpatient Medications   Medication Sig Dispense Refill    Na Sulfate-K Sulfate-Mg Sulf (Suprep Bowel Prep Kit) 17 5-3 13-1 6 GM/177ML SOLN Take 2 Bottles by mouth see administration instructions Suprep bowel prep  Please follow the instructions from the office 2 Bottle 0     No current facility-administered medications for this visit  Blood pressure (!) 182/86, height 5' 10" (1 778 m), weight 99 4 kg (219 lb 3 2 oz)  PHYSICAL EXAM:      General Appearance:   Alert, cooperative, no distress, appears stated age    HEENT:   Normocephalic, atraumatic, anicteric      Neck:  Supple, symmetrical, trachea midline, no adenopathy;    thyroid: no enlargement/tenderness/nodules; no carotid  bruit or JVD    Lungs:   Clear to auscultation bilaterally; no rales, rhonchi or wheezing; respirations unlabored    Heart[de-identified]   S1 and S2 normal; regular rate and rhythm; no murmur, rub, or gallop  Abdomen:   Soft, non-tender, non-distended; normal bowel sounds; no masses, no organomegaly    Extremities: No edema, erythema, wounds, rashes   Rectal:   Deferred                      Lab Results   Component Value Date    WBC 11 25 (H) 02/23/2021    HGB 13 5 02/23/2021    HCT 40 8 02/23/2021    MCV 94 02/23/2021     02/23/2021     Lab Results   Component Value Date    CALCIUM 7 8 (L) 02/23/2021    K 3 9 02/23/2021    CO2 22 02/23/2021     02/23/2021    BUN 7 02/23/2021    CREATININE 0 72 02/23/2021     Lab Results   Component Value Date    ALT 23 02/23/2021    AST 21 02/23/2021    ALKPHOS 64 02/23/2021     Lab Results   Component Value Date    INR 1 04 02/22/2021    PROTIME 13 7 02/22/2021       Ct Abdomen Pelvis With Contrast    Result Date: 2/22/2021  Impression: Sigmoid diverticulitis   Workstation performed: [id number]       ASSESSMENT & PLAN:    Gastroesophageal reflux disease  Patient reports he has not really had symptoms in about a month, but in the long-term has occasionally had issues with substernal burning sensation, which is relieved with antacids  Rule out underlying esophagitis, esophageal ulcer, Snyder's or malignancy     -plan for EGD at the same time as colonoscopy     -again patient was advised regarding risks and benefits of the procedure     -will hold off on starting acid reducers at this time as patient's symptomatology appears relatively mild/intermittent, although may consider instituting acid reducing therapy depending on EGD findings    Diverticulitis of large intestine without perforation or abscess without bleeding  Patient recently hospitalized for his 1st known episode of diverticulitis, uncomplicated, appears to have responded well to antibiotic therapy which was completed yesterday  Rule out underlying inflammatory polyp or malignancy  He also does report some longstanding intermittent loose stools and urgency, although this symptomatology appears relatively mild as well, most likely functional but rule out underlying mild inflammatory bowel disease, celiac disease or microscopic colitis    -will plan for colonoscopy in approximately 2 months    Patient expressed to his family has familiarity with [facility] so he expressed preference to schedule with Dr Giovanni Chambers or Dr Cory Ignacio, will arrange    -patient was advised regarding risks and benefits of the procedure, risks including but not limited to infection, perforation and bleeding     -patient was provided with prescription and instructions for colonoscopy prep    -recommended patient adhere to a low residue diet for another 1-2 weeks, at that point can gradually reintroduce fiber to his diet if he is continuing to do well    - I offered the patient to check celiac disease antibody profile and possibly trial a p r n  antispasmodic for diarrhea, he expresses he does not think his symptomatology is correlated with gluten intake so declines additional blood work or medications at this time, we can consider antispasmodics if his symptoms worsen

## 2021-03-04 NOTE — ASSESSMENT & PLAN NOTE
Patient recently hospitalized for his 1st known episode of diverticulitis, uncomplicated, appears to have responded well to antibiotic therapy which was completed yesterday  Rule out underlying inflammatory polyp or malignancy  He also does report some longstanding intermittent loose stools and urgency, although this symptomatology appears relatively mild as well, most likely functional but rule out underlying mild inflammatory bowel disease, celiac disease or microscopic colitis    -will plan for colonoscopy in approximately 2 months    Patient expressed to his family has familiarity with [facility] so he expressed preference to schedule with Dr Luca Medina or Dr Deric Saravia, will arrange    -patient was advised regarding risks and benefits of the procedure, risks including but not limited to infection, perforation and bleeding     -patient was provided with prescription and instructions for colonoscopy prep    -recommended patient adhere to a low residue diet for another 1-2 weeks, at that point can gradually reintroduce fiber to his diet if he is continuing to do well    - I offered the patient to check celiac disease antibody profile and possibly trial a p r n  antispasmodic for diarrhea, he expresses he does not think his symptomatology is correlated with gluten intake so declines additional blood work or medications at this time, we can consider antispasmodics if his symptoms worsen

## 2021-03-04 NOTE — ASSESSMENT & PLAN NOTE
Patient reports he has not really had symptoms in about a month, but in the long-term has occasionally had issues with substernal burning sensation, which is relieved with antacids    Rule out underlying esophagitis, esophageal ulcer, Snyder's or malignancy     -plan for EGD at the same time as colonoscopy     -again patient was advised regarding risks and benefits of the procedure     -will hold off on starting acid reducers at this time as patient's symptomatology appears relatively mild/intermittent, although may consider instituting acid reducing therapy depending on EGD findings

## 2021-04-29 RX ORDER — FLUTICASONE PROPIONATE 50 MCG
1 SPRAY, SUSPENSION (ML) NASAL AS NEEDED
COMMUNITY

## 2021-04-30 ENCOUNTER — ANESTHESIA EVENT (OUTPATIENT)
Dept: GASTROENTEROLOGY | Facility: AMBULATORY SURGERY CENTER | Age: 51
End: 2021-04-30

## 2021-04-30 ENCOUNTER — HOSPITAL ENCOUNTER (OUTPATIENT)
Dept: GASTROENTEROLOGY | Facility: AMBULATORY SURGERY CENTER | Age: 51
Discharge: HOME | End: 2021-04-30
Payer: COMMERCIAL

## 2021-04-30 ENCOUNTER — ANESTHESIA (OUTPATIENT)
Dept: GASTROENTEROLOGY | Facility: AMBULATORY SURGERY CENTER | Age: 51
End: 2021-04-30

## 2021-04-30 VITALS
BODY MASS INDEX: 29.35 KG/M2 | SYSTOLIC BLOOD PRESSURE: 134 MMHG | HEIGHT: 70 IN | OXYGEN SATURATION: 100 % | TEMPERATURE: 97.3 F | WEIGHT: 205 LBS | HEART RATE: 52 BPM | RESPIRATION RATE: 18 BRPM | DIASTOLIC BLOOD PRESSURE: 81 MMHG

## 2021-04-30 DIAGNOSIS — K57.92 DIVERTICULITIS: ICD-10-CM

## 2021-04-30 DIAGNOSIS — K21.9 GASTROESOPHAGEAL REFLUX DISEASE, UNSPECIFIED WHETHER ESOPHAGITIS PRESENT: ICD-10-CM

## 2021-04-30 PROCEDURE — 45385 COLONOSCOPY W/LESION REMOVAL: CPT | Performed by: INTERNAL MEDICINE

## 2021-04-30 PROCEDURE — 88305 TISSUE EXAM BY PATHOLOGIST: CPT | Performed by: PATHOLOGY

## 2021-04-30 PROCEDURE — 43239 EGD BIOPSY SINGLE/MULTIPLE: CPT | Performed by: INTERNAL MEDICINE

## 2021-04-30 PROCEDURE — 00813 ANES UPR LWR GI NDSC PX: CPT | Performed by: NURSE ANESTHETIST, CERTIFIED REGISTERED

## 2021-04-30 RX ORDER — SODIUM CHLORIDE 9 MG/ML
20 INJECTION, SOLUTION INTRAVENOUS CONTINUOUS
Status: DISCONTINUED | OUTPATIENT
Start: 2021-04-30 | End: 2021-05-04 | Stop reason: HOSPADM

## 2021-04-30 RX ORDER — LIDOCAINE HYDROCHLORIDE 20 MG/ML
INJECTION, SOLUTION EPIDURAL; INFILTRATION; INTRACAUDAL; PERINEURAL AS NEEDED
Status: DISCONTINUED | OUTPATIENT
Start: 2021-04-30 | End: 2021-04-30

## 2021-04-30 RX ORDER — PROPOFOL 10 MG/ML
INJECTION, EMULSION INTRAVENOUS AS NEEDED
Status: DISCONTINUED | OUTPATIENT
Start: 2021-04-30 | End: 2021-04-30

## 2021-04-30 RX ORDER — SODIUM CHLORIDE 9 MG/ML
30 INJECTION, SOLUTION INTRAVENOUS CONTINUOUS
Status: DISCONTINUED | OUTPATIENT
Start: 2021-04-30 | End: 2021-05-04 | Stop reason: HOSPADM

## 2021-04-30 RX ADMIN — PROPOFOL 50 MG: 10 INJECTION, EMULSION INTRAVENOUS at 13:14

## 2021-04-30 RX ADMIN — PROPOFOL 30 MG: 10 INJECTION, EMULSION INTRAVENOUS at 13:23

## 2021-04-30 RX ADMIN — LIDOCAINE HYDROCHLORIDE 50 MG: 20 INJECTION, SOLUTION EPIDURAL; INFILTRATION; INTRACAUDAL; PERINEURAL at 13:08

## 2021-04-30 RX ADMIN — PROPOFOL 70 MG: 10 INJECTION, EMULSION INTRAVENOUS at 13:26

## 2021-04-30 RX ADMIN — PROPOFOL 50 MG: 10 INJECTION, EMULSION INTRAVENOUS at 13:38

## 2021-04-30 RX ADMIN — LIDOCAINE HYDROCHLORIDE 50 MG: 20 INJECTION, SOLUTION EPIDURAL; INFILTRATION; INTRACAUDAL; PERINEURAL at 13:11

## 2021-04-30 RX ADMIN — PROPOFOL 50 MG: 10 INJECTION, EMULSION INTRAVENOUS at 13:12

## 2021-04-30 RX ADMIN — SODIUM CHLORIDE: 9 INJECTION, SOLUTION INTRAVENOUS at 13:03

## 2021-04-30 RX ADMIN — PROPOFOL 30 MG: 10 INJECTION, EMULSION INTRAVENOUS at 13:16

## 2021-04-30 RX ADMIN — PROPOFOL 50 MG: 10 INJECTION, EMULSION INTRAVENOUS at 13:31

## 2021-04-30 RX ADMIN — PROPOFOL 20 MG: 10 INJECTION, EMULSION INTRAVENOUS at 13:19

## 2021-04-30 RX ADMIN — PROPOFOL 150 MG: 10 INJECTION, EMULSION INTRAVENOUS at 13:08

## 2021-04-30 NOTE — ANESTHESIA POSTPROCEDURE EVALUATION
Post-Op Assessment Note    CV Status:  Stable  Pain Score: 0    Pain management: adequate     Mental Status:  Alert and awake   Hydration Status:  Stable   PONV Controlled:  None   Airway Patency:  Patent   Two or more mitigation strategies used for obstructive sleep apnea   Post Op Vitals Reviewed: Yes      Staff: CRNA   Comments: no obvious ISHAN noted with patient        No complications documented      BP   111/69   Temp     Pulse  55   Resp   16   SpO2   98

## 2021-04-30 NOTE — H&P
History and Physical - [facility]  Aylin Vargas 52 y o  male MRN: [id number]                  HPI: Aylin Vargas is a 52y o  year old male who presents for EGD and colonoscopy      REVIEW OF SYSTEMS: Per the HPI, and otherwise unremarkable  Historical Information   Past Medical History:   Diagnosis Date    Diverticulitis of colon     in hospital 3/2021    GERD (gastroesophageal reflux disease)     Heartburn     Sleep apnea     mild, no device needed     Past Surgical History:   Procedure Laterality Date    WISDOM TOOTH EXTRACTION       Social History   Social History     Substance and Sexual Activity   Alcohol Use Yes    Frequency: 4 or more times a week    Drinks per session: 1 or 2    Binge frequency: Never    Comment: occasional     Social History     Substance and Sexual Activity   Drug Use Never     Social History     Tobacco Use   Smoking Status Current Every Day Smoker    Packs/day: 0 50    Years: 30 00    Pack years: 15 00    Types: Cigarettes   Smokeless Tobacco Never Used   Tobacco Comment    quitting, down to 4-5 daily     Family History   Problem Relation Age of Onset    Prostate cancer Father     Cancer Maternal Grandfather        Meds/Allergies       Current Outpatient Medications:     fluticasone (FLONASE) 50 mcg/act nasal spray    Current Facility-Administered Medications:     sodium chloride 0 9 % infusion, 30 mL/hr, Intravenous, Continuous    sodium chloride 0 9 % infusion, 20 mL/hr, Intravenous, Continuous    No Known Allergies    Objective     /82   Pulse 57   Temp (!) 97 3 °F (36 3 °C) (Temporal)   Resp 18   Ht 5' 10" (1 778 m)   Wt 93 kg (205 lb)   SpO2 98%   BMI 29 41 kg/m²       PHYSICAL EXAM    Gen: NAD  CV: RRR  CHEST: Clear  ABD: soft, NT/ND  EXT: no edema      ASSESSMENT/PLAN:  This is a 52y o  year old male here for EGD and colonoscopy, and he is stable and optimized for his procedure

## 2021-04-30 NOTE — ANESTHESIA PREPROCEDURE EVALUATION
Procedure:  COLONOSCOPY  EGD    Relevant Problems   ANESTHESIA (within normal limits)      CARDIO (within normal limits)      ENDO (within normal limits)      GI/HEPATIC   (+) Gastroesophageal reflux disease      /RENAL (within normal limits)      GYN (within normal limits)      HEMATOLOGY (within normal limits)      MUSCULOSKELETAL (within normal limits)      NEURO/PSYCH (within normal limits)      PULMONARY   (+) Sleep apnea      Other   (+) Diverticulitis        Physical Exam    Airway    Mallampati score: II  TM Distance: >3 FB  Neck ROM: full     Dental   No notable dental hx     Cardiovascular  Cardiovascular exam normal    Pulmonary  Pulmonary exam normal     Other Findings        Anesthesia Plan  ASA Score- 3     Anesthesia Type- IV sedation with anesthesia with ASA Monitors  Additional Monitors:   Airway Plan:           Plan Factors-Exercise tolerance (METS): >4 METS  Chart reviewed  Patient is not a current smoker  Induction- intravenous  Postoperative Plan-     Informed Consent- Anesthetic plan and risks discussed with patient

## 2021-04-30 NOTE — DISCHARGE INSTRUCTIONS
Upper Endoscopy and Colonoscopy   WHAT YOU NEED TO KNOW:   An upper endoscopy is also called an upper gastrointestinal (GI) endoscopy, or an esophagogastroduodenoscopy (EGD)  It is a procedure to examine the inside of your esophagus, stomach, and duodenum (first part of the small intestine) with a scope  You may feel bloated, gassy, or have some abdominal discomfort after your procedure  Your throat may be sore for 24 to 36 hours  You may burp or pass gas from air that is still inside your body  A colonoscopy is a procedure to examine the inside of your colon (intestine) with a scope  Polyps or tissue growths may have been removed during your colonoscopy  It is normal to feel bloated and to have some abdominal discomfort  You should be passing gas  If you have hemorrhoids or you had polyps removed, you may have a small amount of bleeding  DISCHARGE INSTRUCTIONS:   Seek care immediately if:   · You have sudden, severe abdominal pain  · You have problems swallowing  · You have a large amount of black, sticky bowel movements or blood in your bowel movements  · You have sudden trouble breathing  · You feel weak, lightheaded, or faint or your heart beats faster than normal for you  Contact your healthcare provider if:   · You have a fever and chills  · You have nausea or are vomiting  · Your abdomen is bloated or feels full and hard  · You have abdominal pain  · You have a large amount of black, sticky bowel movements or blood in your bowel movements  · You have not had a bowel movement for 3 days after your procedure  · You have rash or hives  · You have questions or concerns about your procedure  Activity:   ·       Do not lift, strain, or run for 24 hours after your procedure  ·       Rest after your procedure  You have been given medicine to relax you  Do not drive or make important decisions until the day after your procedure   Return to your normal activity as directed  ·       Relieve gas and discomfort from bloating by lying on your right side with a heating pad on your abdomen  You may need to take short walks to help the gas move out  Eat small meals until bloating is relieved  Follow up with your healthcare provider as directed: Write down your questions so you remember to ask them during your visits  If you take a blood thinner, please review the specific instructions from your endoscopist about when you should resume it  These can be found in the Recommendation and Your Medication list sections of this After Visit Summary  High Fiber Diet   WHAT YOU NEED TO KNOW:   What is a high-fiber diet? A high-fiber diet includes foods that have a high amount of fiber  Fiber is the part of fruits, vegetables, and grains that is not broken down by your body  Fiber keeps your bowel movements regular  Fiber can also help lower your cholesterol level, control blood sugar in people with diabetes, and relieve constipation  Fiber can also help you control your weight because it helps you feel full faster  Most adults should eat 25 to 35 grams of fiber each day  Talk to your dietitian or healthcare provider about the amount of fiber you need  What foods are good sources of fiber? · Foods with at least 4 grams of fiber per serving:      ? ? to ½ cup of high-fiber cereal (check the nutrition label on the box)    ? ½ cup of blackberries or raspberries    ? 4 dried prunes    ? 1 cooked artichoke    ? ½ cup of cooked legumes, such as lentils, or red, kidney, and aquino beans    · Foods with 1 to 3 grams of fiber per serving:      ? 1 slice of whole-wheat, pumpernickel, or rye bread    ? ½ cup of cooked brown rice    ? 4 whole-wheat crackers    ? 1 cup of oatmeal    ? ½ cup of cereal with 1 to 3 grams of fiber per serving (check the nutrition label on the box)    ? 1 small piece of fruit, such as an apple, banana, pear, kiwi, or orange    ?  3 dates    ? ½ cup of canned apricots, fruit cocktail, peaches, or pears    ? ½ cup of raw or cooked vegetables, such as carrots, cauliflower, cabbage, spinach, squash, or corn  What are some ways that I can increase fiber in my diet? · Choose brown or wild rice instead of white rice  · Use whole wheat flour in recipes instead of white or all-purpose flour  · Add beans and peas to casseroles or soups  · Choose fresh fruit and vegetables with peels or skins on instead of juices  What other guidelines should I follow? · Add fiber to your diet slowly  You may have abdominal discomfort, bloating, and gas if you add fiber to your diet too quickly  · Drink plenty of liquids as you add fiber to your diet  You may have nausea or develop constipation if you do not drink enough water  Ask how much liquid to drink each day and which liquids are best for you  CARE AGREEMENT:   You have the right to help plan your care  Discuss treatment options with your healthcare provider to decide what care you want to receive  You always have the right to refuse treatment  The above information is an  only  It is not intended as medical advice for individual conditions or treatments  Talk to your doctor, nurse or pharmacist before following any medical regimen to see if it is safe and effective for you  © Copyright [address] Information is for End User's use only and may not be sold, redistributed or otherwise used for commercial purposes  All illustrations and images included in CareNotes® are the copyrighted property of [employer]  or [facility] Wesley Lozano   Hemorrhoids   WHAT YOU NEED TO KNOW:   What are hemorrhoids? Hemorrhoids are swollen blood vessels inside your rectum (internal hemorrhoids) or on your anus (external hemorrhoids)  Sometimes a hemorrhoid may prolapse  This means it extends out of your anus  What increases my risk for hemorrhoids?    · Pregnancy or obesity    · Straining or sitting for a long time during bowel movements    · Liver disease    · Weak muscles around the anus caused by older age, rectal surgery, or anal intercourse    · A lack of physical activity    · Chronic diarrhea or constipation    · A low-fiber diet    What are the signs and symptoms of hemorrhoids? · Pain or itching around your anus or inside your rectum    · Swelling or bumps around your anus    · Bright red blood in your bowel movement, on the toilet paper, or in the toilet bowl    · Tissue bulging out of your anus (prolapsed hemorrhoids)    · Incontinence (poor control over urine or bowel movements)    How are hemorrhoids diagnosed? Your healthcare provider will ask about your symptoms, the foods you eat, and your bowel movements  He or she will examine your anus for external hemorrhoids  You may need the following:  · A digital rectal exam  is a test to check for hemorrhoids  Your healthcare provider will put a gloved finger inside your anus to feel for the hemorrhoids  · An anoscopy  is a test that uses a scope (small tube with a light and camera on the end) to look at your hemorrhoids  How are hemorrhoids treated? Treatment will depend on your symptoms  You may need any of the following:  · Medicines  can help decrease pain and swelling, and soften your bowel movement  The medicine may be a pill, pad, cream, or ointment  · Procedures  may be used to shrink or remove your hemorrhoid  Examples include rubber-band ligation, sclerotherapy, and photocoagulation  These procedures may be done in your healthcare provider's office  Ask your healthcare provider for more information about these procedures  · Surgery  may be needed to shrink or remove your hemorrhoids  How can I manage my symptoms? · Apply ice on your anus for 15 to 20 minutes every hour or as directed  Use an ice pack, or put crushed ice in a plastic bag   Cover it with a towel before you apply it to your anus  Ice helps prevent tissue damage and decreases swelling and pain  · Take a sitz bath  Fill a bathtub with 4 to 6 inches of warm water  You may also use a sitz bath pan that fits inside a toilet bowl  Sit in the sitz bath for 15 minutes  Do this 3 times a day, and after each bowel movement  The warm water can help decrease pain and swelling  · Keep your anal area clean  Gently wash the area with warm water daily  Soap may irritate the area  After a bowel movement, wipe with moist towelettes or wet toilet paper  Dry toilet paper can irritate the area  How can I help prevent hemorrhoids? · Do not strain to have a bowel movement  Do not sit on the toilet too long  These actions can increase pressure on the tissues in your rectum and anus  · Drink plenty of liquids  Liquids can help prevent constipation  Ask how much liquid to drink each day and which liquids are best for you  · Eat a variety of high-fiber foods  Examples include fruits, vegetables, and whole grains  Ask your healthcare provider how much fiber you need each day  You may need to take a fiber supplement  · Exercise as directed  Exercise, such as walking, may make it easier to have a bowel movement  Ask your healthcare provider to help you create an exercise plan  · Do not have anal sex  Anal sex can weaken the skin around your rectum and anus  · Avoid heavy lifting  This can cause straining and increase your risk for another hemorrhoid  When should I seek immediate care? · You have severe pain in your rectum or around your anus  · You have severe pain in your abdomen and you are vomiting  · You have bleeding from your anus that soaks through your underwear  When should I contact my healthcare provider? · You have frequent and painful bowel movements  · Your hemorrhoid looks or feels more swollen than usual      · You do not have a bowel movement for 2 days or more       · You see or feel tissue coming through your anus  · You have questions or concerns about your condition or care  CARE AGREEMENT:   You have the right to help plan your care  Learn about your health condition and how it may be treated  Discuss treatment options with your healthcare providers to decide what care you want to receive  You always have the right to refuse treatment  The above information is an  only  It is not intended as medical advice for individual conditions or treatments  Talk to your doctor, nurse or pharmacist before following any medical regimen to see if it is safe and effective for you  © Copyright [address] Information is for End User's use only and may not be sold, redistributed or otherwise used for commercial purposes  All illustrations and images included in CareNotes® are the copyrighted property of A D A M , Inc  or [facility] [place] [facility] Wesley Lozano   Diverticulosis   WHAT YOU NEED TO KNOW:   Diverticulosis is a condition that causes small pockets called diverticula to form in your intestine  These pockets make it difficult for bowel movements to pass through your digestive system  DISCHARGE INSTRUCTIONS:   Return to the emergency department if:   · You have severe pain on the left side of your lower abdomen  · Your bowel movements are bright or dark red  Contact your healthcare provider if:   · You have a fever and chills  · You feel dizzy or lightheaded  · You have nausea, or you are vomiting  · You have a change in your bowel movements  · You have questions or concerns about your condition or care  Medicines:   · Medicines  to soften your bowel movements may be given  You may also need medicines to treat symptoms such as bloating and pain  · Take your medicine as directed  Contact your healthcare provider if you think your medicine is not helping or if you have side effects  Tell him or her if you are allergic to any medicine   Keep a list of the medicines, vitamins, and herbs you take  Include the amounts, and when and why you take them  Bring the list or the pill bottles to follow-up visits  Carry your medicine list with you in case of an emergency  Self-care: The goal of treatment is to manage any symptoms you have and prevent other problems such as diverticulitis  Diverticulitis is swelling or infection of the diverticula  Your healthcare provider may recommend any of the following:  · Eat a variety of high-fiber foods  High-fiber foods help you have regular bowel movements  High-fiber foods include cooked beans, fruits, vegetables, and some cereals  Most adults need 25 to 35 grams of fiber each day  Your healthcare provider may recommend that you have more  Ask your healthcare provider how much fiber you need  Increase fiber slowly  You may have abdominal discomfort, bloating, and gas if you add fiber to your diet too quickly  You may need to take a fiber supplement if you are not getting enough fiber from food  · Drink liquids as directed  You may need to drink 2 to 3 liters (8 to 12 cups) of liquids every day  Ask your healthcare provider how much liquid to drink each day and which liquids are best for you  · Apply heat  on your abdomen for 20 to 30 minutes every 2 hours for as many days as directed  Heat helps decrease pain and muscle spasms  Help prevent diverticulitis or other symptoms: The following may help decrease your risk for diverticulitis or symptoms, such as bleeding  Talk to your provider about these or other things you can do to prevent problems that may occur with diverticulosis  · Exercise regularly  Ask your healthcare provider about the best exercise plan for you  Exercise can help you have regular bowel movements  Get 30 minutes of exercise on most days of the week  · Maintain a healthy weight  Ask your healthcare provider how much you should weigh   Ask him or her to help you create a weight loss plan if you are overweight  · Do not smoke  Nicotine and other chemicals in cigarettes increase your risk for diverticulitis  Ask your healthcare provider for information if you currently smoke and need help to quit  E-cigarettes or smokeless tobacco still contain nicotine  Talk to your healthcare provider before you use these products  · Ask your healthcare provider if it is safe to take NSAIDs  NSAIDs may increase your risk of diverticulitis  Follow up with your healthcare provider as directed:  Write down your questions so you remember to ask them during your visits  © Copyright [address] Information is for End User's use only and may not be sold, redistributed or otherwise used for commercial purposes  All illustrations and images included in CareNotes® are the copyrighted property of A D A M , Inc  or [address] [place], [place] [address]zef[place]ino   The above information is an  only  It is not intended as medical advice for individual conditions or treatments  Talk to your doctor, nurse or pharmacist before following any medical regimen to see if it is safe and effective for you  Colorectal Polyps   WHAT YOU NEED TO KNOW:   Colorectal polyps are small growths of tissue in the lining of the colon and rectum  Most polyps are hyperplastic polyps and are usually benign (noncancerous)  Certain types of polyps, called adenomatous polyps, may turn into cancer  DISCHARGE INSTRUCTIONS:   Follow up with your healthcare provider or gastroenterologist as directed: You may need to return for more tests, such as another colonoscopy  Write down your questions so you remember to ask them during your visits  Reduce your risk for colorectal polyps:   · Eat a variety of healthy foods:  Healthy foods include fruit, vegetables, whole-grain breads, low-fat dairy products, beans, lean meat, and fish  Ask if you need to be on a special diet      · Maintain a healthy weight:  Ask your healthcare provider if you need to lose weight and how much you need to lose  Ask for help with a weight loss program     · Exercise:  Begin to exercise slowly and do more as you get stronger  Talk with your healthcare provider before you start an exercise program      · Limit alcohol:  Your risk for polyps increases the more you drink  · Do not smoke: If you smoke, it is never too late to quit  Ask for information about how to stop  For support and more information:   · Ulises Eddy ([facility])  [address] [place], [place] [address]  Phone: [phone]  Web Address: www digestive  niddk nih gov    Contact your healthcare provider or gastroenterologist if:   · You have a fever  · You have chills, a cough, or feel weak and achy  · You have abdominal pain that does not go away or gets worse after you take medicine  · Your abdomen is swollen  · You are losing weight without trying  · You have questions or concerns about your condition or care  Seek care immediately or call 911 if:   · You have sudden shortness of breath  · You have a fast heart rate, fast breathing, or are too dizzy to stand up  · You have severe abdominal pain  · You see blood in your bowel movement  © Copyright [address] Information is for End User's use only and may not be sold, redistributed or otherwise used for commercial purposes  All illustrations and images included in CareNotes® are the copyrighted property of A D A M , Inc  or [facility]note 3[facility] Wesley Lozano   The above information is an  only  It is not intended as medical advice for individual conditions or treatments  Talk to your doctor, nurse or pharmacist before following any medical regimen to see if it is safe and effective for you  Gastroesophageal Reflux Disease   WHAT YOU NEED TO KNOW:   What is gastroesophageal reflux disease (GERD)? GERD is reflux that occurs more than twice a week for a few weeks   Reflux means acid and food in the stomach back up into the esophagus  It usually causes heartburn and other symptoms  GERD can cause other health problems over time if it is not treated  What causes GERD? GERD often occurs when the lower muscle (sphincter) of the esophagus does not close properly  The sphincter normally opens to let food into the stomach  It then closes to keep food and stomach acid in the stomach  If the sphincter does not close properly, stomach acid and food back up (reflux) into the esophagus  The following may increase your risk for GERD:  · Certain foods such as spicy foods, chocolate, foods that contain caffeine, peppermint, and fried foods    · Hiatal hernia    · Certain medicines such as calcium channel blockers (used to treat high blood pressure), allergy medicines, sedatives, or antidepressants    · Pregnancy or obesity    · Lying down after a meal    · Drinking alcohol or smoking    What are the signs and symptoms of GERD? · Heartburn (burning pain in your chest)    · Pain after meals that spreads to your neck, jaw, or shoulder    · Pain that gets better when you change positions    · Bitter or acid taste in your mouth    · A dry cough    · Trouble swallowing or pain with swallowing    · Hoarseness or a sore throat    · Burping or hiccups    · Feeling full soon after you start eating    How is GERD diagnosed? Your healthcare provider will ask about your symptoms and when they started  Tell him or her about other medical conditions you have, your eating habits, and your activities  You may also need any of the following:  · The amount of acid  in your esophagus and stomach may be checked  A small probe is used to check the amount  · An endoscopy  is a procedure used to look at the inside of your esophagus and stomach  An endoscope is a bendable tube with a light and camera on the end  Your healthcare provider may remove a small sample of tissue and send it to a lab for tests      · X-ray  pictures may be taken of your stomach and intestines (bowel)  You may be given a chalky liquid to drink before the pictures are taken  This liquid helps your stomach and intestines show up better on the x-rays  · Pressure and function  tests of your esophagus can help find problems such as a hiatal hernia  How is GERD treated? · Medicines  are used to decrease stomach acid  Medicine may also be used to help your lower esophageal sphincter and stomach contract (tighten) more  · Surgery  is done to wrap the upper part of the stomach around the esophageal sphincter  This will strengthen the sphincter and prevent reflux  How can I manage GERD? · Do not have foods or drinks that may increase heartburn  These include chocolate, peppermint, fried or fatty foods, drinks that contain caffeine, or carbonated drinks (soda)  Other foods include spicy foods, onions, tomatoes, and tomato-based foods  Do not have foods or drinks that can irritate your esophagus, such as citrus fruits, juices, and alcohol  · Do not eat large meals  When you eat a lot of food at one time, your stomach needs more acid to digest it  Eat 6 small meals each day instead of 3 large ones, and eat slowly  Do not eat meals 2 to 3 hours before bedtime  · Elevate the head of your bed  Place 6-inch blocks under the head of your bed frame  You may also use more than one pillow under your head and shoulders while you sleep  · Maintain a healthy weight  If you are overweight, weight loss may help relieve symptoms of GERD  · Do not smoke  Smoking weakens the lower esophageal sphincter and increases the risk of GERD  Ask your healthcare provider for information if you currently smoke and need help to quit  E-cigarettes or smokeless tobacco still contain nicotine  Talk to your healthcare provider before you use these products       · Do not wear clothing that is tight around your waist   Tight clothing can put pressure on your stomach and cause or worsen GERD symptoms  Call your local emergency number (911 in the [address]) if:   · You have severe chest pain and sudden trouble breathing  When should I seek immediate care? · You have trouble breathing after you vomit  · You have trouble swallowing, or pain with swallowing  · Your bowel movements are black, bloody, or tarry-looking  · Your vomit looks like coffee grounds or has blood in it  When should I call my doctor? · You feel full and cannot burp or vomit  · You vomit large amounts, or you vomit often  · You are losing weight without trying  · Your symptoms get worse or do not improve with treatment  · You have questions or concerns about your condition or care  CARE AGREEMENT:   You have the right to help plan your care  Learn about your health condition and how it may be treated  Discuss treatment options with your healthcare providers to decide what care you want to receive  You always have the right to refuse treatment  The above information is an  only  It is not intended as medical advice for individual conditions or treatments  Talk to your doctor, nurse or pharmacist before following any medical regimen to see if it is safe and effective for you  © Copyright [address] Information is for End User's use only and may not be sold, redistributed or otherwise used for commercial purposes  All illustrations and images included in CareNotes® are the copyrighted property of [employer]  or [facility]  Gastric Polyps   WHAT YOU NEED TO KNOW:   What are gastric polyps? Gastric polyps are growths that form in the lining of your stomach  They are not cancerous, but certain types of polyps can change into cancer  What puts me at risk for gastric polyps?    · Chronic gastritis caused by NSAIDs use or ulcers    · Long-term use of proton pump inhibitor medicines (used to decrease stomach acid)    · An infection in your stomach caused by H  pylori bacteria    What are the symptoms of gastric polyps? You may have no symptoms  Large polyps may cause any of the following:  · Abdominal pain    · Indigestion    · Vomiting after meals or vomiting blood    · Dark or bloody bowel movements    How are gastric polyps diagnosed? Gastric polyps are usually found during an endoscopy for another reason  All or part of the polyp will be removed during the test  Your healthcare provider may also remove tissue from your stomach  The polyps and tissue are sent to the lab for testing  How are gastric polyps treated? Some types of polyps go away on their own  Other types may be removed if they are large, you have symptoms, or abnormal cells are found  Large polyps and abnormal cells increase your risk for cancer  You may also need antibiotics if you have an infection caused by H  pylori bacteria  Part of your stomach may be removed if the polyps cannot be removed and abnormal cells are found  When should I seek immediate care? · You have blood in your vomit  · You have dark or bloody bowel movements  · You have severe pain in your abdomen that does not go away after you take medicine  When should I contact my healthcare provider? · You have indigestion that does not go away with treatment  · You vomit after meals  · You have questions or concerns about your condition or care  CARE AGREEMENT:   You have the right to help plan your care  Learn about your health condition and how it may be treated  Discuss treatment options with your healthcare providers to decide what care you want to receive  You always have the right to refuse treatment  The above information is an  only  It is not intended as medical advice for individual conditions or treatments  Talk to your doctor, nurse or pharmacist before following any medical regimen to see if it is safe and effective for you    © Copyright [employer] 2020 Information is for End User's use only and may not be sold, redistributed or otherwise used for commercial purposes   All illustrations and images included in CareNotes® are the copyrighted property of A D A M , Inc  or Marielena Mai

## 2021-05-06 NOTE — RESULT ENCOUNTER NOTE
Please inform patient that there biopsies were benign  Colonoscopy 5 years    Upper endoscopy 1 year with biopsies of the EG junction and the antrum due to intestinal metaplasia

## 2022-03-29 ENCOUNTER — TELEPHONE (OUTPATIENT)
Dept: GASTROENTEROLOGY | Facility: CLINIC | Age: 52
End: 2022-03-29

## 2022-03-29 ENCOUNTER — PREP FOR PROCEDURE (OUTPATIENT)
Dept: GASTROENTEROLOGY | Facility: CLINIC | Age: 52
End: 2022-03-29

## 2022-03-29 DIAGNOSIS — K31.A0 GASTRIC INTESTINAL METAPLASIA: Primary | ICD-10-CM

## 2022-03-29 DIAGNOSIS — Z87.19 HISTORY OF BARRETT'S ESOPHAGUS: ICD-10-CM

## 2022-03-29 NOTE — TELEPHONE ENCOUNTER
[facility] Assessment    Name: Shilpa Mcallister  YOB: 1970  Last Height: 5' 10" (1 778 m)  Last weight: 93 kg (205 lb)  BMI: 29 41 kg/m²  Procedure: Egd  Diagnosis: intestinal metaplasia, plascencia's  Date of procedure: 6/3/22  Prep: none  Responsible : yes  Phone#: [phone]  Name completing form: Raji Castro  Date form completed: 03/29/22    If the patient answers yes to any of these questions, schedule in a hospital  Are you pregnant: No  Do you rely on a wheelchair for mobility: No  Have you been diagnosed with End Stage Renal Disease (ESRD): No  Do you need oxygen during the day: No  Have you had a heart attack or stroke within the past three months: No  Have you had a seizure within the past three months: No  Have you ever been informed by anesthesia that you have a difficult airway: No  Additional Questions  Have you had any cardiac testing or are under the care of a Cardiologist (see cardiac list): No  Cardiac list:   Do you have an implanted cardiac defibrillator: No (Comment:  This patient should be scheduled in the hospital)    Have any bleeding problems, such as anemia or hemophilia (If patient has H&H result below 8, schedule in hospital   H&H must be within 30 days of procedure): No    Had an organ transplant within the past 3 months: No    Do you have any present infections: No  Do you get short of breath when walking a few blocks: No  Have you been diagnosed with diabetes: No  Comments (provide cardiac provider information if applicable):

## 2022-05-26 ENCOUNTER — TELEPHONE (OUTPATIENT)
Dept: GASTROENTEROLOGY | Facility: CLINIC | Age: 52
End: 2022-05-26

## 2022-05-26 NOTE — TELEPHONE ENCOUNTER
Spoke to pt confirming his EGD scheduled on 6/3/22 with Dr Luca Medina at [facility]  I informed pt that she would be called 1-2 days prior with arrival time  I informed pt to be npo after midnight and that he will need a  the day of the procedure due to being under sedation  Pt did not have any questions

## 2022-06-02 ENCOUNTER — TELEPHONE (OUTPATIENT)
Dept: GASTROENTEROLOGY | Facility: AMBULARY SURGERY CENTER | Age: 52
End: 2022-06-02

## 2022-06-02 NOTE — TELEPHONE ENCOUNTER
Patients GI provider:  Dr. Wynn    Number to return call: (  [phone]    Reason for call: Pt calling to verify that his procedure has been approved    Scheduled procedure/appointment date if applicable: Apt/procedure   6-3-22

## 2022-06-03 ENCOUNTER — ANESTHESIA EVENT (OUTPATIENT)
Dept: GASTROENTEROLOGY | Facility: AMBULATORY SURGERY CENTER | Age: 52
End: 2022-06-03

## 2022-06-03 ENCOUNTER — ANESTHESIA (OUTPATIENT)
Dept: GASTROENTEROLOGY | Facility: AMBULATORY SURGERY CENTER | Age: 52
End: 2022-06-03

## 2022-06-03 ENCOUNTER — HOSPITAL ENCOUNTER (OUTPATIENT)
Dept: GASTROENTEROLOGY | Facility: AMBULATORY SURGERY CENTER | Age: 52
Discharge: HOME | End: 2022-06-03
Payer: COMMERCIAL

## 2022-06-03 VITALS
DIASTOLIC BLOOD PRESSURE: 74 MMHG | RESPIRATION RATE: 18 BRPM | HEART RATE: 74 BPM | TEMPERATURE: 97.9 F | OXYGEN SATURATION: 98 % | SYSTOLIC BLOOD PRESSURE: 124 MMHG

## 2022-06-03 DIAGNOSIS — K22.70 BARRETT'S ESOPHAGUS WITHOUT DYSPLASIA: Primary | ICD-10-CM

## 2022-06-03 DIAGNOSIS — K21.00 GASTROESOPHAGEAL REFLUX DISEASE WITH ESOPHAGITIS WITHOUT HEMORRHAGE: ICD-10-CM

## 2022-06-03 DIAGNOSIS — Z87.19 HISTORY OF BARRETT'S ESOPHAGUS: ICD-10-CM

## 2022-06-03 DIAGNOSIS — K31.A0 GASTRIC INTESTINAL METAPLASIA: ICD-10-CM

## 2022-06-03 PROBLEM — K31.A11 INTESTINAL METAPLASIA OF ANTRUM OF STOMACH WITHOUT DYSPLASIA: Status: ACTIVE | Noted: 2022-06-03

## 2022-06-03 PROCEDURE — 88305 TISSUE EXAM BY PATHOLOGIST: CPT | Performed by: PATHOLOGY

## 2022-06-03 PROCEDURE — 43239 EGD BIOPSY SINGLE/MULTIPLE: CPT | Performed by: INTERNAL MEDICINE

## 2022-06-03 PROCEDURE — 88313 SPECIAL STAINS GROUP 2: CPT | Performed by: PATHOLOGY

## 2022-06-03 RX ORDER — LIDOCAINE HYDROCHLORIDE 10 MG/ML
INJECTION, SOLUTION EPIDURAL; INFILTRATION; INTRACAUDAL; PERINEURAL AS NEEDED
Status: DISCONTINUED | OUTPATIENT
Start: 2022-06-03 | End: 2022-06-03

## 2022-06-03 RX ORDER — PROPOFOL 10 MG/ML
INJECTION, EMULSION INTRAVENOUS AS NEEDED
Status: DISCONTINUED | OUTPATIENT
Start: 2022-06-03 | End: 2022-06-03

## 2022-06-03 RX ORDER — OMEPRAZOLE 10 MG/1
10 CAPSULE, DELAYED RELEASE ORAL DAILY
Qty: 90 CAPSULE | Refills: 3 | Status: SHIPPED | OUTPATIENT
Start: 2022-06-03

## 2022-06-03 RX ORDER — OMEPRAZOLE 20 MG/1
20 CAPSULE, DELAYED RELEASE ORAL DAILY
COMMUNITY
End: 2022-06-03 | Stop reason: HOSPADM

## 2022-06-03 RX ORDER — SODIUM CHLORIDE 9 MG/ML
INJECTION, SOLUTION INTRAVENOUS CONTINUOUS PRN
Status: DISCONTINUED | OUTPATIENT
Start: 2022-06-03 | End: 2022-06-03

## 2022-06-03 RX ADMIN — PROPOFOL 25 MG: 10 INJECTION, EMULSION INTRAVENOUS at 13:10

## 2022-06-03 RX ADMIN — LIDOCAINE HYDROCHLORIDE 100 MG: 10 INJECTION, SOLUTION EPIDURAL; INFILTRATION; INTRACAUDAL; PERINEURAL at 13:05

## 2022-06-03 RX ADMIN — SODIUM CHLORIDE: 9 INJECTION, SOLUTION INTRAVENOUS at 12:50

## 2022-06-03 RX ADMIN — PROPOFOL 50 MG: 10 INJECTION, EMULSION INTRAVENOUS at 13:08

## 2022-06-03 RX ADMIN — PROPOFOL 50 MG: 10 INJECTION, EMULSION INTRAVENOUS at 13:07

## 2022-06-03 RX ADMIN — PROPOFOL 100 MG: 10 INJECTION, EMULSION INTRAVENOUS at 13:06

## 2022-06-03 RX ADMIN — PROPOFOL 25 MG: 10 INJECTION, EMULSION INTRAVENOUS at 13:09

## 2022-06-03 RX ADMIN — PROPOFOL 100 MG: 10 INJECTION, EMULSION INTRAVENOUS at 13:05

## 2022-06-03 NOTE — H&P
History and Physical - [facility]  Kavita Zayas 50 y o  male MRN: [id number]                  HPI: Kavita Zayas is a 50y o  year old male who presents for Snyder's esophagus with intestinal metaplasia lower esophagus as well as the antrum  First noted last year  REVIEW OF SYSTEMS: Per the HPI, and otherwise unremarkable  Historical Information   Past Medical History:   Diagnosis Date    Colon polyp     Diverticulitis of colon     in hospital 3/2021    GERD (gastroesophageal reflux disease)     Heartburn     Sleep apnea     mild, no device needed     Past Surgical History:   Procedure Laterality Date    COLONOSCOPY      WISDOM TOOTH EXTRACTION       Social History   Social History     Substance and Sexual Activity   Alcohol Use Yes    Alcohol/week: 10 0 standard drinks    Types: 10 Cans of beer per week    Comment: per week     Social History     Substance and Sexual Activity   Drug Use Never     Social History     Tobacco Use   Smoking Status Current Every Day Smoker    Packs/day: 0 25    Years: 30 00    Pack years: 7 50    Types: Cigarettes   Smokeless Tobacco Never Used   Tobacco Comment    quitting, down to 4-5 daily     Family History   Problem Relation Age of Onset    Prostate cancer Father     Cancer Maternal Grandfather        Meds/Allergies       Current Outpatient Medications:     fluticasone (FLONASE) 50 mcg/act nasal spray    omeprazole (PriLOSEC) 20 mg delayed release capsule    No Known Allergies    Objective     Pulse 62   Temp 97 9 °F (36 6 °C) (Temporal)   Resp 18   SpO2 96%       PHYSICAL EXAM    Gen: NAD  Head: NCAT  CV: RRR  CHEST: Clear  ABD: soft, NT/ND  EXT: no edema      ASSESSMENT/PLAN:  This is a 50y o  year old male here for upper endoscopy, and he is stable and optimized for his procedure

## 2022-06-03 NOTE — ANESTHESIA PREPROCEDURE EVALUATION
Procedure:  EGD    Relevant Problems   GI/HEPATIC   (+) Gastroesophageal reflux disease      PULMONARY   (+) Sleep apnea        Physical Exam    Airway    Mallampati score: III  TM Distance: >3 FB  Neck ROM: full     Dental   No notable dental hx     Cardiovascular  Cardiovascular exam normal    Pulmonary  Pulmonary exam normal     Other Findings        Anesthesia Plan  ASA Score- 2     Anesthesia Type- IV sedation with anesthesia with ASA Monitors  Additional Monitors:   Airway Plan:           Plan Factors-Exercise tolerance (METS): >4 METS  Chart reviewed  EKG reviewed  Existing labs reviewed  Patient summary reviewed  Patient is not a current smoker  Induction- intravenous  Postoperative Plan-     Informed Consent- Anesthetic plan and risks discussed with patient

## 2022-07-07 ENCOUNTER — TELEPHONE (OUTPATIENT)
Dept: OTHER | Facility: OTHER | Age: 52
End: 2022-07-07

## 2022-07-08 NOTE — TELEPHONE ENCOUNTER
Sriram Ortega, the only office visit I see is Arden Mock for this patient  Routed to [place] [id number]

## 2022-10-12 PROBLEM — A41.9 SEVERE SEPSIS (HCC): Status: RESOLVED | Noted: 2021-02-22 | Resolved: 2022-10-12

## 2022-10-12 PROBLEM — R65.20 SEVERE SEPSIS (HCC): Status: RESOLVED | Noted: 2021-02-22 | Resolved: 2022-10-12

## 2022-12-06 ENCOUNTER — OFFICE VISIT (OUTPATIENT)
Dept: GASTROENTEROLOGY | Facility: CLINIC | Age: 52
End: 2022-12-06

## 2022-12-06 VITALS
SYSTOLIC BLOOD PRESSURE: 205 MMHG | DIASTOLIC BLOOD PRESSURE: 109 MMHG | HEIGHT: 70 IN | WEIGHT: 228 LBS | BODY MASS INDEX: 32.64 KG/M2 | HEART RATE: 84 BPM

## 2022-12-06 DIAGNOSIS — K21.00 GASTROESOPHAGEAL REFLUX DISEASE WITH ESOPHAGITIS WITHOUT HEMORRHAGE: ICD-10-CM

## 2022-12-06 DIAGNOSIS — Z87.19 HISTORY OF BARRETT'S ESOPHAGUS: Primary | ICD-10-CM

## 2022-12-06 DIAGNOSIS — K31.A0 GASTRIC INTESTINAL METAPLASIA: ICD-10-CM

## 2022-12-06 DIAGNOSIS — R03.0 ELEVATED BLOOD PRESSURE READING: ICD-10-CM

## 2022-12-06 DIAGNOSIS — Z12.11 COLON CANCER SCREENING: ICD-10-CM

## 2022-12-06 DIAGNOSIS — K57.92 DIVERTICULITIS: ICD-10-CM

## 2022-12-06 NOTE — PROGRESS NOTES
[facility] - Outpatient Follow-up Note  Annika Jamison 50 y o  male MRN: [id number]  Encounter: [id number]          ASSESSMENT AND PLAN:      1  History of Snyder's esophagus  One tongue 1-2 cm next surveillance June of 2025    2  Gastroesophageal reflux disease with esophagitis without hemorrhage  Well controlled on 10 mg of omeprazole daily    3  Gastric intestinal metaplasia  Next surveillance endoscopy as above    4  Diverticulitis  High-fiber diet    5  Colon cancer screening  Next colonoscopy April of 2026 he will otherwise follow-up in a year     6  Elevated blood pressure reading  Replete blood pressure 186/103  Asymptomatic  Discussed conservative measures  Patient will obtain a cough and follow-up with primary care provider     ______________________________________________________________________    SUBJECTIVE:  Very pleasant 47-year-old gentleman we see for gastroesophageal reflux, Barretts esophagus, intestinal metaplasia of the antrum diverticulitis and colon cancer screening  Presents today for routine follow-up  He is doing very well on to 10 mg of omeprazole a day  Colon cancer screening and Barretts surveillance are up-to-date  No chest pain palpitations shortness of breath syncope double vision or headaches  Discussed low-salt diet weight loss exercise  Additionally reviewed conservative measures for reflux  REVIEW OF SYSTEMS IS OTHERWISE NEGATIVE        Historical Information   Past Medical History:   Diagnosis Date   • Colon polyp    • Diverticulitis of colon     in hospital 3/2021   • GERD (gastroesophageal reflux disease)    • Heartburn    • Sleep apnea     mild, no device needed     Past Surgical History:   Procedure Laterality Date   • COLONOSCOPY     • WISDOM TOOTH EXTRACTION       Social History   Social History     Substance and Sexual Activity   Alcohol Use Yes   • Alcohol/week: 10 0 standard drinks   • Types: 10 Cans of beer per week    Comment: per week     Social History     Substance and Sexual Activity   Drug Use Never     Social History     Tobacco Use   Smoking Status Every Day   • Packs/day: 0 25   • Years: 30 00   • Pack years: 7 50   • Types: Cigarettes   Smokeless Tobacco Never   Tobacco Comments    quitting, down to 4-5 daily     Family History   Problem Relation Age of Onset   • Prostate cancer Father    • Cancer Maternal Grandfather        Meds/Allergies       Current Outpatient Medications:   •  fluticasone (FLONASE) 50 mcg/act nasal spray  •  omeprazole (PriLOSEC) 10 mg delayed release capsule    No Known Allergies        Objective     Blood pressure (!) 205/109, pulse 84, height 5' 10" (1 778 m), weight 103 kg (228 lb)  Body mass index is 32 71 kg/m²  Repeat blood pressure 186/103 asymptomatic    PHYSICAL EXAM:      General Appearance:   Alert, cooperative, no distress   HEENT:   Normocephalic, atraumatic, anicteric      Neck:  Supple, symmetrical, trachea midline   Lungs:   Clear to auscultation bilaterally; no rales, rhonchi or wheezing; respirations unlabored    Heart[de-identified]   Regular rate and rhythm; no murmur, rub, or gallop  Abdomen:   Soft, non-tender, non-distended; normal bowel sounds; no masses, no organomegaly    Genitalia:   Deferred    Rectal:   Deferred    Extremities:  No cyanosis, clubbing or edema    Pulses:  2+ and symmetric    Skin:  No jaundice, rashes, or lesions    Lymph nodes:  No palpable cervical lymphadenopathy        Lab Results:   No visits with results within 1 Day(s) from this visit     Latest known visit with results is:   Hospital Outpatient Visit on 06/03/2022   Component Date Value   • Case Report 06/03/2022                      Value:Surgical Pathology Report                         Case: [id number]                                   Authorizing Provider:  Lanie Galindo MD    Collected:           06/03/2022 1315              Ordering Location:     [facility] Received:            06/03/2022 2058                                     [place]                                                                  Pathologist:           Reuben Oropeza MD                                                       Specimens:   A) - Stomach, cold bx antrum hx of intestinal metaplasia                                            B) - Polyp, Stomach/Small Intestine, cold bx fundus polyp                                           C) - Esophagus, cold bx eg junction/ lower esoph hx of barretts                           • Final Diagnosis 06/03/2022                      Value: This result contains rich text formatting which cannot be displayed here  • Note 06/03/2022                      Value: This result contains rich text formatting which cannot be displayed here  • Additional Information 06/03/2022                      Value: This result contains rich text formatting which cannot be displayed here  • Gross Description 06/03/2022                      Value: This result contains rich text formatting which cannot be displayed here  Radiology Results:   No results found    Answers for HPI/ROS submitted by the patient on 11/29/2022  Frequency: rarely

## 2022-12-06 NOTE — PATIENT INSTRUCTIONS
Due to Barretts and intestinal metaplasia of the antrum repeat EGD in June of 2025 with biopsies of the antrum and lower esophagus    Colon cancer screening up-to-date next colonoscopy in April of 2026

## 2023-02-04 PROBLEM — Z12.11 COLON CANCER SCREENING: Status: RESOLVED | Noted: 2022-12-06 | Resolved: 2023-02-04

## 2023-06-06 DIAGNOSIS — K21.00 GASTROESOPHAGEAL REFLUX DISEASE WITH ESOPHAGITIS WITHOUT HEMORRHAGE: ICD-10-CM

## 2023-06-06 DIAGNOSIS — K22.70 BARRETT'S ESOPHAGUS WITHOUT DYSPLASIA: ICD-10-CM

## 2023-06-06 RX ORDER — OMEPRAZOLE 10 MG/1
CAPSULE, DELAYED RELEASE ORAL
Qty: 90 CAPSULE | Refills: 3 | Status: SHIPPED | OUTPATIENT
Start: 2023-06-06

## 2024-03-08 ENCOUNTER — TELEPHONE (OUTPATIENT)
Dept: GASTROENTEROLOGY | Facility: CLINIC | Age: 54
End: 2024-03-08

## 2024-07-04 DIAGNOSIS — K22.70 BARRETT'S ESOPHAGUS WITHOUT DYSPLASIA: ICD-10-CM

## 2024-07-04 DIAGNOSIS — K21.00 GASTROESOPHAGEAL REFLUX DISEASE WITH ESOPHAGITIS WITHOUT HEMORRHAGE: ICD-10-CM

## 2024-07-05 RX ORDER — OMEPRAZOLE 10 MG/1
CAPSULE, DELAYED RELEASE ORAL
Qty: 90 CAPSULE | Refills: 3 | Status: SHIPPED | OUTPATIENT
Start: 2024-07-05

## 2025-05-19 ENCOUNTER — TELEPHONE (OUTPATIENT)
Dept: GASTROENTEROLOGY | Facility: CLINIC | Age: 55
End: 2025-05-19

## 2025-05-19 NOTE — TELEPHONE ENCOUNTER
Pt is due for a egd 3 years- hx of barretts (former Dr Wynn pt). I lmom for pt to please call back to schedule with one of our GI providers as Dr Wynn has retired. Recall letter mailed.

## 2025-06-17 ENCOUNTER — OFFICE VISIT (OUTPATIENT)
Dept: OBGYN CLINIC | Facility: CLINIC | Age: 55
End: 2025-06-17
Payer: COMMERCIAL

## 2025-06-17 VITALS — HEIGHT: 70 IN | BODY MASS INDEX: 32.71 KG/M2

## 2025-06-17 DIAGNOSIS — G89.29 CHRONIC BILATERAL LOW BACK PAIN WITHOUT SCIATICA: Primary | ICD-10-CM

## 2025-06-17 DIAGNOSIS — M54.50 CHRONIC BILATERAL LOW BACK PAIN WITHOUT SCIATICA: Primary | ICD-10-CM

## 2025-06-17 PROCEDURE — 99204 OFFICE O/P NEW MOD 45 MIN: CPT | Performed by: PHYSICAL MEDICINE & REHABILITATION

## 2025-06-17 RX ORDER — PREDNISONE 20 MG/1
40 TABLET ORAL
Qty: 10 TABLET | Refills: 0 | Status: SHIPPED | OUTPATIENT
Start: 2025-06-17 | End: 2025-06-22

## 2025-06-17 RX ORDER — METHOCARBAMOL 500 MG/1
500 TABLET, FILM COATED ORAL 3 TIMES DAILY
COMMUNITY
Start: 2025-06-11 | End: 2025-06-17

## 2025-06-17 RX ORDER — METHOCARBAMOL 750 MG/1
750 TABLET, FILM COATED ORAL
Qty: 30 TABLET | Refills: 0 | Status: SHIPPED | OUTPATIENT
Start: 2025-06-17

## 2025-06-17 NOTE — ASSESSMENT & PLAN NOTE
Lumbar pain that is multifactorial and predominantly due to muscular strain and sacroiliac joint dysfunction versus a discogenic pain.  There are no concerning neurological deficits.    We discussed different treatment options and decided to proceed with oral steroids, methocarbamol.  Treatment has included oral diclofenac and low-dose methocarbamol 3 times a day.    The patient should start physical therapy.  I will see him back in 4 weeks to reassess.  Orders:    predniSONE 20 mg tablet; Take 2 tablets (40 mg total) by mouth daily with breakfast for 5 days    Ambulatory referral to Physical Therapy; Future    methocarbamol (ROBAXIN) 750 mg tablet; Take 1 tablet (750 mg total) by mouth daily at bedtime as needed for muscle spasms

## 2025-06-17 NOTE — PROGRESS NOTES
Assessment & Plan  Chronic bilateral low back pain without sciatica  Lumbar pain that is multifactorial and predominantly due to muscular strain and sacroiliac joint dysfunction versus a discogenic pain.  There are no concerning neurological deficits.    We discussed different treatment options and decided to proceed with oral steroids, methocarbamol.  Treatment has included oral diclofenac and low-dose methocarbamol 3 times a day.    The patient should start physical therapy.  I will see him back in 4 weeks to reassess.  Orders:    predniSONE 20 mg tablet; Take 2 tablets (40 mg total) by mouth daily with breakfast for 5 days    Ambulatory referral to Physical Therapy; Future    methocarbamol (ROBAXIN) 750 mg tablet; Take 1 tablet (750 mg total) by mouth daily at bedtime as needed for muscle spasms    Imaging Studies (I personally reviewed images in PACS and report if it was available):  Lumbar spine x-rays that are most recent to this encounter were reviewed.  These images show L5-S1 narrowing.  No acute osseous abnormalities.    HPI:  Crow Powell is a 55 y.o. male  who presents for evaluation of   Chief Complaint   Patient presents with    Lower Back - Pain       Onset/Mechanism: Pain started two weeks ago after lifting heavy garden soil.  Location: Depends on the day.  Upper/lower back and then at the base of the low back and into the right leg. Across the low back.  Radiation: As above.  Quality: Pain.  Provocative: Moving around and trying to turn.  Severity: Painful.  Associated Symptoms: Range of motion.  Treatment so far: Diclofenac and methocarbamol. [facility]    No red flag symptoms including fever/chills, unintentional weight change, bowel/bladder incontinence, scissoring gait, personal/family history of cancer, pain worse at night, intravenous drug abuse or trauma.      Following history reviewed and updated:  Past Medical History[1]  Past Surgical History[2]  Social History   Social  "History     Substance and Sexual Activity   Alcohol Use Yes    Alcohol/week: 10.0 standard drinks of alcohol    Types: 10 Cans of beer per week    Comment: per week     Social History     Substance and Sexual Activity   Drug Use Never     Tobacco Use History[3]  Family History[4]  Allergies[5]     Constitutional:  Ht 5' 10\" (1.778 m)   BMI 32.71 kg/m²    General: NAD.   Eyes: Anicteric sclerae.  Neck: Supple.  Lungs: Unlabored breathing.  Cardiovascular: No lower extremity edema.  Skin: Intact without erythema.  Neurologic: Sensation intact to light touch.  Psychiatric: Mood and affect are appropriate.    Orthopedic Examination  Inspection: No obvious deformities, lesions or rashes.  ROM: Within normal limits.  Palpation: Tender along the right SI joint area. There are no step offs.  Neuro: Bilateral extremity strength is normal and symmetric. No muscle atrophy or abnormal tone.  Bilateral extremity muscle stretch reflexes are physiologic and symmetric .  No myelopathic signs.   Sensation to light touch is intact throughout.  Neural compression testing: Normal bilateral SLR/dural stretch.  Normal Fairchild's maneuver.    Gait is normal.    Procedures                     [1]   Past Medical History:  Diagnosis Date    Colon polyp     Diverticulitis of colon     in hospital 3/2021    GERD (gastroesophageal reflux disease)     Heartburn     Sleep apnea     mild, no device needed   [2]   Past Surgical History:  Procedure Laterality Date    COLONOSCOPY      WISDOM TOOTH EXTRACTION     [3]   Social History  Tobacco Use   Smoking Status Every Day    Current packs/day: 0.25    Average packs/day: 0.3 packs/day for 30.0 years (7.5 ttl pk-yrs)    Types: Cigarettes   Smokeless Tobacco Never   Tobacco Comments    quitting, down to 4-5 daily   [4]   Family History  Problem Relation Name Age of Onset    Prostate cancer Father      Cancer Maternal Grandfather Socrates Thompson    [5] No Known Allergies    "

## 2025-06-30 ENCOUNTER — EVALUATION (OUTPATIENT)
Dept: PHYSICAL THERAPY | Facility: CLINIC | Age: 55
End: 2025-06-30
Attending: PHYSICAL MEDICINE & REHABILITATION
Payer: COMMERCIAL

## 2025-06-30 DIAGNOSIS — M54.50 CHRONIC BILATERAL LOW BACK PAIN WITHOUT SCIATICA: ICD-10-CM

## 2025-06-30 DIAGNOSIS — G89.29 CHRONIC BILATERAL LOW BACK PAIN WITHOUT SCIATICA: ICD-10-CM

## 2025-06-30 PROCEDURE — 97162 PT EVAL MOD COMPLEX 30 MIN: CPT | Performed by: PHYSICAL THERAPIST

## 2025-06-30 NOTE — HOME EXERCISE EDUCATION
Program_ID:[id number]   Access Code: [email or web address]  URL: [email or web address]  Date: 06-  Prepared By: Nidhi Ferrari    Program Notes      Exercises      - Supine Lower Trunk Rotation - 1 x daily - 7 x weekly -  sets - 10 reps      - Supine Posterior Pelvic Tilt - 1 x daily - 7 x weekly - 3 sets - 10 reps - 2-3sec hold      - Supine Figure 4 Piriformis Stretch - 1 x daily - 7 x weekly -  sets - 10 reps - 5-10sec hold

## 2025-06-30 NOTE — PROGRESS NOTES
PT Evaluation     Today's date: 2025  Patient name: Crow Powell  : 1970  MRN: [id number]  Referring provider: Cale Moseley DO  Dx:   Encounter Diagnosis     ICD-10-CM    1. Chronic bilateral low back pain without sciatica  M54.50 Ambulatory referral to Physical Therapy    G89.29           Start Time: 1700  Stop Time: 1745  Total time in clinic (min): 45 minutes    Assessment  Impairments: abnormal gait, abnormal or restricted ROM, activity intolerance, impaired balance, impaired physical strength, lacks appropriate home exercise program, pain with function, weight-bearing intolerance, poor posture , poor body mechanics, participation limitations and activity limitations  Functional limitations: lying on back, prolonged sitting, lifting/carrying at waist level, lifting and twisting    Assessment details: Patient is a 55 y.o. male who presents to outpatient PT with reports of chronic low back pain with recent exacerbation about 1 month ago. Patient presents with complaints of symptoms down bilateral thighs occasionally. Patient noted to have some limitations in lumbar spine mobility with increase in pain noted with lumbar extension and repetitions, however relief in pain with standing lumbar flexion and repetitions. Noted decrease in pain with supine PPTs. Difficulty with contraction and isolation of TA musculature, with increase in low back pain noted due to compensations. Hypomobility noted to lumbar spine but without pain. No changes in pain noted with manual lumbar long-axis distraction. Signs and symptoms suggest possible nerve compression causing radicular symptoms, thus decompression with lumbar flexion and PPTs reduced patient's pain and related symptoms. Patient is very active, however movement and walking does help manage his pain. He notices increase in pain primarily first thing in AM after sleeping and after prolonged sitting (IE: long commutes to work). Patient would benefit from  skilled PT services to address these impairments and to maximize function.  Thank you for the referral.    Barriers to therapy: Chronicity of symptoms  Understanding of Dx/Px/POC: good     Prognosis: good    Goals  Impairment Goals 4-6 weeks   In order to maximize function patient will be able to...   - Decrease intensity/duration/frequency of pain to 4/10  - Demonstrate symmetrical lumbar AROM without pain  - Increase hip/LE strength to 4/5 throughout  - Demonstrate improved hip flexibility as demonstrated by increased ROM through therapeutic exercise    Functional Goals 6-8 weeks  In order to return to prior level of function patient will be able to...   - Participate in ADL's/IADL's/sport specific activities with no greater than 2/10 pain.    - Increase Functional Status Measure (FOTO) to: > predicted at discharge  - Demonstrate independence and compliant with HEP  - Demonstrate functional activities with good core and glute strength without compensation/pain/difficulty   - Ascend and descend stairs without increased pain/compensation/difficulty and a reciprocal gait pattern.  - Lift >10 pounds and perform work related tasks with proper mechanics   - Patient will be able to tolerate sitting and sleeping for prolonged periods of time without pain    Plan  Patient would benefit from: skilled PT  Planned modality interventions: cryotherapy, electrical stimulation/Russian stimulation, traction and low level laser therapy  Other planned modality interventions: moist heat    Planned therapy interventions: joint mobilization, manual therapy, neuromuscular re-education, patient education, strengthening, stretching, therapeutic activities, therapeutic exercise, home exercise program, functional ROM exercises, [place] taping, postural training, balance/weight bearing training, body mechanics training, flexibility, IASTM, kinesiology taping, massage, nerve gliding, transfer training and gait training    Frequency: 1-2x  week  Duration in weeks: 4  Treatment plan discussed with: patient, PTA and referring physician        Subjective Evaluation    History of Present Illness  Mechanism of injury: Crow Powell is a 55 y.o. year-old male who presents to outpatient PT with reports of chronic low back pain that has been ongoing for many years. He reports he often notices pain aggravates with lifting and twisting tasks. He reports when he experiences the pain, it often lasts for few days and then goes away. Patient reports recently he noticed increased pain with a lifting/twisting motion around mid May 2025. He reports however after about 2 weeks, pain did not subside. He went to urgent care beginning of  and received anti-inflammatories and muscle relaxers to take that did not help much per patient. Patient saw orthopedic on  and was recommended for PT.   Quality of life: good    Patient Goals  Patient goals for therapy: decreased pain, increased motion, improved balance, increased strength, independence with ADLs/IADLs, return to sport/leisure activities and return to work    Pain  Current pain ratin  At best pain rating: 3  At worst pain ratin  Location: low back, bilateral LEs  Quality: radiating, dull ache and sharp  Relieving factors: change in position  Aggravating factors: sitting, stair climbing and lifting  Progression: improved    Social Support  Steps to enter house: yes  Stairs in house: yes     Employment status: working  Treatments  Current treatment: physical therapy        Objective     Postural Observations    Additional Postural Observation Details  Standing Posture: toeing out standing in standing, slight Right hip drop; decreased lumbar lordosis    Palpation   Left   Muscle spasm in the erector spinae, lumbar paraspinals and quadratus lumborum.     Right   Muscle spasm in the erector spinae, lumbar paraspinals and quadratus lumborum.     Tenderness     Lumbar Spine  Tenderness in the spinous process.      Left Hip   Tenderness in the PSIS.     Right Hip   Tenderness in the PSIS.     Neurological Testing     Sensation     Lumbar   Left   Intact: light touch    Right   Intact: light touch    Active Range of Motion     Lumbar   Flexion:  WFL  Extension: 8 degrees  with pain  Left lateral flexion:  WFL  Right lateral flexion:  WFL  Left rotation:  WFL and with pain  Right rotation:  WFL    Joint Play     Hypomobile: T12, L1, L2, L3, L4 and L5   Mechanical Assessment    Cervical      Thoracic      Lumbar    Standing flexion: repeated movements   Pain intensity: better  Pain level: decreased  Standing extension: repeated movements  Pain intensity: worse  Pain level: increased    Strength/Myotome Testing     Left Hip   Planes of Motion   Flexion: 4-  Extension: 3  Abduction: 3+  Adduction: 3  External rotation: 3+    Right Hip   Planes of Motion   Flexion: 4-  Extension: 3  Abduction: 3+  Adduction: 3  External rotation: 3+  Internal rotation: 3    Left Knee   Flexion: 4  Extension: 4    Right Knee   Flexion: 4  Extension: 4    Muscle Activation   Patient unable to activate left transverse abdominals and right transverse abdominals.     Tests     Lumbar     Left   Negative crossed SLR, passive SLR and slump test.     Right   Negative crossed SLR, passive SLR and slump test.     Left Pelvic Girdle/Sacrum   Negative: active SLR test.     Right Pelvic Girdle/Sacrum   Negative: active SLR test.     Left Hip   Negative RONEY and FADIR.     Right Hip   Negative RONEY and FADIR.               POC Expires Auth Status Start Date Expiration Date PT Visit Limit    7/30/2025 Pending   BOMN   Date 6/30/2025       Used 1       Remaining           Diagnosis: chronic low back pain/radiculopathy   Precautions: GERD   Next Physician's Appt:   [employer] HEP:   Manuals 6/30       STM        Joint Mobs        LAD                        Neuro Re-Ed        TA ball press        PPT        Hip ADD ball squeeze        BKFO / emani        Wobble  Board        Heel raises        Standing Hip ABD, Ext                        Ther Ex        HS stretch        LTRs        DKTC w/ ball        POEs        Seated p-ball rollouts                                 Ther Activity             Bike/TM for posture        Leg Press        Harshad walkouts        Paloff Press                             Pt Ed HEP, POC       Re-Evaluation             Modalities             Heat/ice (PRN)

## 2025-07-07 ENCOUNTER — OFFICE VISIT (OUTPATIENT)
Dept: PHYSICAL THERAPY | Facility: CLINIC | Age: 55
End: 2025-07-07
Attending: PHYSICAL MEDICINE & REHABILITATION
Payer: COMMERCIAL

## 2025-07-07 DIAGNOSIS — M54.50 CHRONIC BILATERAL LOW BACK PAIN WITHOUT SCIATICA: Primary | ICD-10-CM

## 2025-07-07 DIAGNOSIS — G89.29 CHRONIC BILATERAL LOW BACK PAIN WITHOUT SCIATICA: Primary | ICD-10-CM

## 2025-07-07 PROCEDURE — 97110 THERAPEUTIC EXERCISES: CPT | Performed by: PHYSICAL THERAPIST

## 2025-07-07 PROCEDURE — 97530 THERAPEUTIC ACTIVITIES: CPT | Performed by: PHYSICAL THERAPIST

## 2025-07-07 PROCEDURE — 97112 NEUROMUSCULAR REEDUCATION: CPT | Performed by: PHYSICAL THERAPIST

## 2025-07-07 NOTE — PROGRESS NOTES
"Daily Note     Today's date: 2025  Patient name: Crow Powell  : 1970  MRN: [id number]  Referring provider: Cale Moseley DO  Dx:   Encounter Diagnosis     ICD-10-CM    1. Chronic bilateral low back pain without sciatica  M54.50     G89.29           Start Time: 1720  Stop Time: 1800  Total time in clinic (min): 40 minutes    Subjective: Patient reports no pain on arrival and denies radicular symptoms.       Objective: See treatment diary below      Assessment: Tolerated treatment well. Performed prone on elbows static stretch with some discomfort noted after 1 min. Performed repetitions of prone on elbows which helped relieve symptoms. Worked on TA contraction with good form and proper isolation of musculature. Added paloff press to further work on stabilization with good response and no increase in pain noted. Educated patient on DOMS after PT exercises and will progress next visit as appropriate. Patient exhibited good technique with therapeutic exercises and would benefit from continued PT      Plan: Continue per plan of care.  Progress treatment as tolerated.          POC Expires Auth Status Start Date Expiration Date PT Visit Limit    2025 Pending   BOMN   Date 2025      Used 1 2      Remaining           Diagnosis: chronic low back pain/radiculopathy   Precautions: GERD   Next Physician's Appt:   [employer] HEP:   Manuals       STM        Joint Mobs        LAD                        Neuro Re-Ed        TA ball press  No ball 2\" x15      TA + SLR  2\" x10 ea      PPT        BKFO / clams        Bridges  On SB 10x2 partial ROM      Wobble Board        Standing Hip ABD, Ext                        Ther Ex        HS stretch        LTRs        DKTC w/ ball        POEs  Static x1min, 2x10 reps      Seated p-ball rollouts  FWD 2x10                               Ther Activity             Bike/TM for posture  Bike x5min      Leg Press        Harshad walkouts        Paloff Press    " [place] 8# 2x10 ea                         Pt Ed HEP, POC DOMS      Re-Evaluation             Modalities             Heat/ice (PRN)

## 2025-07-10 ENCOUNTER — OFFICE VISIT (OUTPATIENT)
Dept: PHYSICAL THERAPY | Facility: CLINIC | Age: 55
End: 2025-07-10
Attending: PHYSICAL MEDICINE & REHABILITATION
Payer: COMMERCIAL

## 2025-07-10 DIAGNOSIS — G89.29 CHRONIC BILATERAL LOW BACK PAIN WITHOUT SCIATICA: Primary | ICD-10-CM

## 2025-07-10 DIAGNOSIS — M54.50 CHRONIC BILATERAL LOW BACK PAIN WITHOUT SCIATICA: Primary | ICD-10-CM

## 2025-07-10 PROCEDURE — 97530 THERAPEUTIC ACTIVITIES: CPT | Performed by: PHYSICAL THERAPIST

## 2025-07-10 PROCEDURE — 97112 NEUROMUSCULAR REEDUCATION: CPT | Performed by: PHYSICAL THERAPIST

## 2025-07-10 PROCEDURE — 97110 THERAPEUTIC EXERCISES: CPT | Performed by: PHYSICAL THERAPIST

## 2025-07-10 NOTE — PROGRESS NOTES
"Daily Note     Today's date: 7/10/2025  Patient name: Crow Powell  : 1970  MRN: [id number]  Referring provider: Cale Moseley DO  Dx:   Encounter Diagnosis     ICD-10-CM    1. Chronic bilateral low back pain without sciatica  M54.50     G89.29           Start Time: 1652  Stop Time: 1730  Total time in clinic (min): 38 minutes    Subjective: Patient reports no significant pain on arrival. He reports a little sore after last session but no increase in pain.       Objective: See treatment diary below      Assessment: Tolerated treatment well. Progressed core stability and strengthening exercises per patient's tolerance. Cuing required to avoid arching of low back with exercises. Added supine DKTC with LE antonio with limited ROM due to glute weakness but no increase in pain. Patient reported no increase in pain with exercises and reported feeling better end of session compared to on arrival. Patient exhibited good technique with therapeutic exercises and would benefit from continued PT      Plan: Continue per plan of care.  Progress treatment as tolerated.          POC Expires Auth Status Start Date Expiration Date PT Visit Limit    2025 Approved (23) 2025 BOMN   Date 2025 2025 7/10/2025     Used 1 2 3     Remaining 22 21 20        Diagnosis: chronic low back pain/radiculopathy   Precautions: GERD   Next Physician's Appt:   [employer] HEP:   Manuals 6/30 7/7 7/10     STM        Joint Mobs        LAD                        Neuro Re-Ed        TA ball press  No ball 2\" x15      TA + SLR  2\" x10 ea 2\" x10 ea     PPT        BKFO / clams        Bridges  On SB 10x2 partial ROM      Supine TB pulldowns for core        Prone swimmers   Alt x10 ea     Quadruped Bird-Dog        Wobble Board                                Ther Ex        HS stretch        LTRs        DKTC w/ ball   + LE roll-ins x15     POEs  Static x1min, 2x10 reps 2x10     Seated p-ball rollouts  FWD 2x10                    "            Ther Activity             Bike/TM for posture  Bike x5min Bike x5min     Leg Press        Harshad walkouts        Paloff Press    [place] 8# 2x10 ea  Harshad 10# 2x10 ea       Standing KB holds w/ march   10# KB alt x15 ea     Dirty Baby   10# KB 20ft x3 laps             Pt Ed HEP, POC DOMS      Re-Evaluation             Modalities             Heat/ice (PRN)

## 2025-07-14 ENCOUNTER — OFFICE VISIT (OUTPATIENT)
Dept: PHYSICAL THERAPY | Facility: CLINIC | Age: 55
End: 2025-07-14
Attending: PHYSICAL MEDICINE & REHABILITATION
Payer: COMMERCIAL

## 2025-07-14 DIAGNOSIS — G89.29 CHRONIC BILATERAL LOW BACK PAIN WITHOUT SCIATICA: Primary | ICD-10-CM

## 2025-07-14 DIAGNOSIS — M54.50 CHRONIC BILATERAL LOW BACK PAIN WITHOUT SCIATICA: Primary | ICD-10-CM

## 2025-07-14 PROCEDURE — 97530 THERAPEUTIC ACTIVITIES: CPT

## 2025-07-14 PROCEDURE — 97110 THERAPEUTIC EXERCISES: CPT

## 2025-07-14 PROCEDURE — 97112 NEUROMUSCULAR REEDUCATION: CPT

## 2025-07-14 NOTE — PROGRESS NOTES
"Daily Note     Today's date: 2025  Patient name: Crow Powell  : 1970  MRN: [id number]  Referring provider: Cale Moseley DO  Dx:   Encounter Diagnosis     ICD-10-CM    1. Chronic bilateral low back pain without sciatica  M54.50     G89.29                      Subjective: Pt states that his back has been feeling better overall, still some soreness but the tightness is less.        Objective: See treatment diary below      Assessment: Tolerated treatment well.  Progressed pt with hip and core strengthening as indicated.  Pt challenged with progressions and muscle fatigue is noted.  Cues for form and to avoid hip ER during side stepping with good carryover noted.  Hamstring stretch B/L with tightness noted R>L.  Pt progressing slowly towards his goals and will benefit from continued therapy.        Plan: Continue per plan of care.         POC Expires Auth Status Start Date Expiration Date PT Visit Limit    2025 Approved (23) 2025 BOMN   Date 2025 2025 7/10/2025 2025    Used 1 2 3 4    Remaining 22 21 20 19       Diagnosis: chronic low back pain/radiculopathy   Precautions: GERD   Next Physician's Appt:   [employer] HEP:   Manuals 6/30 7/7 7/10 7/14    STM        Joint Mobs        LAD                        Neuro Re-Ed        TA ball press  No ball 2\" x15      TA + SLR  2\" x10 ea 2\" x10 ea 10x ea     PPT        Elevated clams    20x ea    Bridges  On SB 10x2 partial ROM  GTB 20x    Supine TB pulldowns for core        Prone swimmers   Alt x10 ea     Quadruped Bird-Dog        Wobble Board    2 mins ea    SLS on foam    30\"x2 ea    Heel taps to table in supine    10x ea alt            Ther Ex        HS stretch    10x10\" ea    LTRs    10x ea alt    DKTC w/ ball   + LE roll-ins x15     POEs  Static x1min, 2x10 reps 2x10     Seated p-ball rollouts  FWD 2x10                               Ther Activity             Bike/TM for posture  Bike x5min Bike x5min Bike recumb x5 min  "   Leg Press    90# 10x  110# 10x3 seat 5    Harshad walkouts        Paloff Press    [place] 8# 2x10 ea  [place] 10# 2x10 ea       Standing KB holds w/ march   10# KB alt x15 ea     Dirty Baby   10# KB 20ft x3 laps     X-walks/ side stepping    SS: GTB 3x at mirror    Pt Ed HEP, POC DOMS      Re-Evaluation             Modalities             Heat/ice (PRN)

## 2025-07-17 ENCOUNTER — OFFICE VISIT (OUTPATIENT)
Dept: PHYSICAL THERAPY | Facility: CLINIC | Age: 55
End: 2025-07-17
Attending: PHYSICAL MEDICINE & REHABILITATION
Payer: COMMERCIAL

## 2025-07-17 DIAGNOSIS — G89.29 CHRONIC BILATERAL LOW BACK PAIN WITHOUT SCIATICA: Primary | ICD-10-CM

## 2025-07-17 DIAGNOSIS — M54.50 CHRONIC BILATERAL LOW BACK PAIN WITHOUT SCIATICA: Primary | ICD-10-CM

## 2025-07-17 PROCEDURE — 97112 NEUROMUSCULAR REEDUCATION: CPT | Performed by: PHYSICAL THERAPIST

## 2025-07-17 PROCEDURE — 97530 THERAPEUTIC ACTIVITIES: CPT | Performed by: PHYSICAL THERAPIST

## 2025-07-17 NOTE — PROGRESS NOTES
"Daily Note     Today's date: 2025  Patient name: Crow Powell  : 1970  MRN: [id number]  Referring provider: Cale Moseley DO  Dx:   Encounter Diagnosis     ICD-10-CM    1. Chronic bilateral low back pain without sciatica  M54.50     G89.29           Start Time: 1700  Stop Time: 1745  Total time in clinic (min): 45 minutes    Subjective: Patient reports he was a little sore in his back after last session, but no increase in pain. He reports primarily notices pain with twisting motions/activities.       Objective: See treatment diary below      Assessment: Tolerated treatment well. Progressed core stability exercises to incorporate obliques and anti-rotation stability training. Added seated on SB scapular and core strengthening activities. Performed exercises today with good form and without increase in pain reported. Patient exhibited good technique with therapeutic exercises and would benefit from continued PT      Plan: Continue per plan of care.  Progress treatment as tolerated.          POC Expires Auth Status Start Date Expiration Date PT Visit Limit    2025 Approved (23) 2025 BOMN   Date 2025 2025 7/10/2025 2025 2025   Used 1 2 3 4 5   Remaining 22 21 20 19 18      Diagnosis: chronic low back pain/radiculopathy   Precautions: GERD   Next Physician's Appt:   [employer] HEP:   Manuals 6/30 7/7 7/10 7/14 7/17   STM        Joint Mobs        LAD                        Neuro Re-Ed        TA ball press  No ball 2\" x15      TA + SLR  2\" x10 ea 2\" x10 ea 10x ea  15x ea   PPT        Elevated clams    20x ea    Bridges  On SB 10x2 partial ROM  GTB 20x    Supine TB pulldowns for core     Blue TB 2x10   Prone swimmers   Alt x10 ea     Quadruped Bird-Dog        Wobble Board    2 mins ea    SLS on foam    30\"x2 ea    Heel taps to table in supine    10x ea alt    Seated on SB diagonals     GTB x15 ea   Seated on SB scap punches     4# bilat 2x10   Ball on wall scap stab w/ " "opp UE lat reach     Alt x15 ea                   Ther Ex        HS stretch    10x10\" ea    LTRs    10x ea alt    DKTC w/ ball   + LE roll-ins x15     POEs  Static x1min, 2x10 reps 2x10     Seated p-ball rollouts  FWD 2x10                               Ther Activity             Bike/TM for posture  Bike x5min Bike x5min Bike recumb x5 min Upright x5min   Leg Press    90# 10x  110# 10x3 seat 5 145# 10x3 seat 5   Harshad walkouts        Paloff Press    Harshad 8# 2x10 ea  [place] 10# 2x10 ea    [place] w/ SS 10# x3, 13# x5 ea   Standing KB holds w/ march   10# KB alt x15 ea     Dirty Baby   10# KB 20ft x3 laps     X-walks/ side stepping    SS: GTB 3x at mirror    Pt Ed HEP, POC DOMS      Re-Evaluation             Modalities             Heat/ice (PRN)                                                   "

## 2025-07-21 ENCOUNTER — TELEPHONE (OUTPATIENT)
Age: 55
End: 2025-07-21

## 2025-07-21 DIAGNOSIS — G89.29 CHRONIC BILATERAL LOW BACK PAIN WITHOUT SCIATICA: Primary | ICD-10-CM

## 2025-07-21 DIAGNOSIS — M54.50 CHRONIC BILATERAL LOW BACK PAIN WITHOUT SCIATICA: Primary | ICD-10-CM

## 2025-07-21 PROCEDURE — 99213 OFFICE O/P EST LOW 20 MIN: CPT | Performed by: PHYSICAL MEDICINE & REHABILITATION

## 2025-07-21 NOTE — TELEPHONE ENCOUNTER
Caller: patient    Doctor: Dr. Moseley    Reason for call: Patient received a call to reschedule his appointment for today 7/21 for an earlier time, patient does not want to reschedule for an earlier time and would like to keep his appointment for 5:00    Call back#: [phone]

## 2025-07-21 NOTE — ASSESSMENT & PLAN NOTE
Patient is here in follow up of lumbar pain that is multifactorial and predominantly due to muscular strain and sacroiliac joint dysfunction versus a discogenic pain.      Treatment has included oral steroids, methocarbamol, oral diclofenac and physical therapy.  The patient is doing really well.  He has had intermittent flareups over the past few years.  He should do well as long as he continues with a home exercise program.  If he ever has a pain flare, can consider a low-dose oral steroid burst.  I will see him back if needed.

## 2025-07-21 NOTE — PROGRESS NOTES
Assessment & Plan  Chronic bilateral low back pain without sciatica  Patient is here in follow up of lumbar pain that is multifactorial and predominantly due to muscular strain and sacroiliac joint dysfunction versus a discogenic pain.      Treatment has included oral steroids, methocarbamol, oral diclofenac and physical therapy.  The patient is doing really well.  He has had intermittent flareups over the past few years.  He should do well as long as he continues with a home exercise program.  If he ever has a pain flare, can consider a low-dose oral steroid burst.  I will see him back if needed.       No follow-ups on file.    Patient is in agreement with the above plan.    HPI:  Crow Powell is a 55 y.o. male  who presents in follow up.  Here for   Chief Complaint   Patient presents with    Lower Back - Pain, Follow-up       Since last visit: See above.  He is feeling better.  He feels 100% improved.      Following history reviewed and updated:  Past Medical History[1]  Past Surgical History[2]  Social History   Social History     Substance and Sexual Activity   Alcohol Use Yes    Alcohol/week: 10.0 standard drinks of alcohol    Types: 10 Cans of beer per week    Comment: per week     Social History     Substance and Sexual Activity   Drug Use Never     Tobacco Use History[3]  Family History[4]  Allergies[5]     Constitutional:  There were no vitals taken for this visit.   General: NAD.  Eyes: Clear sclerae.  ENT: No inflammation, lesion, or mass of lips.  No tracheal deviation.  Musculoskeletal: As mentioned below.  Integumentary: No visible rashes or skin lesions.  Pulmonary/Chest: Effort normal. No respiratory distress.   Neuro: CN's grossly intact, NEGRON.  Psych: Normal affect and judgement.  Vascular: WWP.    Back Exam     Tenderness   The patient is experiencing no tenderness.     Muscle Strength   The patient has normal back strength.    Other   Sensation: normal  Gait: normal   Erythema: no back  redness  Scars: absent             Procedures         [1]   Past Medical History:  Diagnosis Date    Colon polyp     Diverticulitis of colon     in hospital 3/2021    GERD (gastroesophageal reflux disease)     Heartburn     Sleep apnea     mild, no device needed   [2]   Past Surgical History:  Procedure Laterality Date    COLONOSCOPY      WISDOM TOOTH EXTRACTION     [3]   Social History  Tobacco Use   Smoking Status Every Day    Current packs/day: 0.25    Average packs/day: 0.3 packs/day for 30.0 years (7.5 ttl pk-yrs)    Types: Cigarettes   Smokeless Tobacco Never   Tobacco Comments    quitting, down to 4-5 daily   [4]   Family History  Problem Relation Name Age of Onset    Prostate cancer Father      Cancer Maternal Grandfather Socrates Thompson    [5] No Known Allergies

## 2025-07-22 ENCOUNTER — OFFICE VISIT (OUTPATIENT)
Dept: PHYSICAL THERAPY | Facility: CLINIC | Age: 55
End: 2025-07-22
Attending: PHYSICAL MEDICINE & REHABILITATION
Payer: COMMERCIAL

## 2025-07-22 DIAGNOSIS — M54.50 CHRONIC BILATERAL LOW BACK PAIN WITHOUT SCIATICA: Primary | ICD-10-CM

## 2025-07-22 DIAGNOSIS — G89.29 CHRONIC BILATERAL LOW BACK PAIN WITHOUT SCIATICA: Primary | ICD-10-CM

## 2025-07-22 PROCEDURE — 97530 THERAPEUTIC ACTIVITIES: CPT | Performed by: PHYSICAL THERAPIST

## 2025-07-22 PROCEDURE — 97112 NEUROMUSCULAR REEDUCATION: CPT | Performed by: PHYSICAL THERAPIST

## 2025-07-22 NOTE — PROGRESS NOTES
"Discharge Summary    Today's date: 2025  Patient name: Crow Powell  : 1970  MRN: [id number]  Referring provider: Cale Moseley DO  Dx:   Encounter Diagnosis     ICD-10-CM    1. Chronic bilateral low back pain without sciatica  M54.50     G89.29           Start Time: 1715  Stop Time: 1800  Total time in clinic (min): 45 minutes    Subjective: Patient reports feeling good overall. He reports he had follow-up with physician recently and feels like he is ready to be discharged from PT.       Objective: See treatment diary below      Assessment: Tolerated treatment well. Patient exhibited good technique with therapeutic exercises. Reviewed core strengthening and stability exercises. Reviewed exercises to perform at home and/or local gym. Patient will be discharged at this time per patient's request due to improving pain levels and overall postural/core strength. He was advised on benefits of continuing exercises at home to maintain PT gains.       Plan: Discharged to HEP        POC Expires Auth Status Start Date Expiration Date PT Visit Limit    2025 Approved (23) 2025 BOMN   Date 2025 2025 7/10/2025 2025 2025   Used 6 2 3 4 5   Remaining 17 21 20 19 18      Diagnosis: chronic low back pain/radiculopathy   Precautions: GERD   Next Physician's Appt:   [facility]:   Manuals 7/22 7/7 7/10 7/14 7/17   STM        Joint Mobs        LAD                        Neuro Re-Ed        TA ball press  No ball 2\" x15      TA + SLR 15x ea 2\" x10 ea 2\" x10 ea 10x ea  15x ea   PPT        Elevated clams    20x ea    Bridges GTB 2x10 On SB 10x2 partial ROM  GTB 20x    Supine TB pulldowns for core     Blue TB 2x10   Prone swimmers   Alt x10 ea     Quadruped Bird-Dog        Wobble Board 2 min ea   2 mins ea    SLS on foam    30\"x2 ea    Heel taps to table in supine    10x ea alt    Seated on SB diagonals     GTB x15 ea   Seated on SB scap punches     4# bilat 2x10   Ball on wall scap " "stab w/ opp UE lat reach     Alt x15 ea                   Ther Ex        HS stretch    10x10\" ea    LTRs    10x ea alt    DKTC w/ ball   + LE roll-ins x15     POEs  Static x1min, 2x10 reps 2x10     Seated p-ball rollouts  FWD 2x10                               Ther Activity            Bike/TM for posture Bike recumb x5 min Bike x5min Bike x5min Bike recumb x5 min Upright x5min   Leg Press 145# 10x3 seat 5   90# 10x  110# 10x3 seat 5 145# 10x3 seat 5   Harshad walkouts        Paloff Press Harshad w/ SS 13# x5 ea  Sullivan 8# 2x10 ea  Harshad 10# 2x10 ea    Harshad w/ SS 10# x3, 13# x5 ea   Standing KB holds w/ march 10# KB alt x15 ea  10# KB alt x15 ea     Dirty Baby   10# KB 20ft x3 laps     X-walks/ side stepping X walks GTB 20ft x3   SS: GTB 3x at mirror    Pt Ed  DOMS      Re-Evaluation            Modalities            Heat/ice (PRN)                                                   "

## 2025-07-24 ENCOUNTER — APPOINTMENT (OUTPATIENT)
Dept: PHYSICAL THERAPY | Facility: CLINIC | Age: 55
End: 2025-07-24
Attending: PHYSICAL MEDICINE & REHABILITATION
Payer: COMMERCIAL

## 2025-07-24 DIAGNOSIS — K21.00 GASTROESOPHAGEAL REFLUX DISEASE WITH ESOPHAGITIS WITHOUT HEMORRHAGE: ICD-10-CM

## 2025-07-24 DIAGNOSIS — K22.70 BARRETT'S ESOPHAGUS WITHOUT DYSPLASIA: ICD-10-CM

## 2025-07-24 RX ORDER — OMEPRAZOLE 10 MG/1
10 CAPSULE, DELAYED RELEASE ORAL DAILY
Qty: 90 CAPSULE | Refills: 0 | Status: SHIPPED | OUTPATIENT
Start: 2025-07-24

## 2025-07-28 ENCOUNTER — APPOINTMENT (OUTPATIENT)
Dept: PHYSICAL THERAPY | Facility: CLINIC | Age: 55
End: 2025-07-28
Attending: PHYSICAL MEDICINE & REHABILITATION
Payer: COMMERCIAL

## 2025-07-31 ENCOUNTER — APPOINTMENT (OUTPATIENT)
Dept: PHYSICAL THERAPY | Facility: CLINIC | Age: 55
End: 2025-07-31
Attending: PHYSICAL MEDICINE & REHABILITATION
Payer: COMMERCIAL